# Patient Record
Sex: MALE | Race: WHITE | NOT HISPANIC OR LATINO
[De-identification: names, ages, dates, MRNs, and addresses within clinical notes are randomized per-mention and may not be internally consistent; named-entity substitution may affect disease eponyms.]

---

## 2017-02-02 ENCOUNTER — APPOINTMENT (OUTPATIENT)
Dept: SPINE | Facility: CLINIC | Age: 57
End: 2017-02-02

## 2017-02-02 VITALS
HEIGHT: 72 IN | WEIGHT: 257 LBS | DIASTOLIC BLOOD PRESSURE: 76 MMHG | BODY MASS INDEX: 34.81 KG/M2 | SYSTOLIC BLOOD PRESSURE: 120 MMHG

## 2017-11-30 ENCOUNTER — APPOINTMENT (OUTPATIENT)
Dept: SPINE | Facility: CLINIC | Age: 57
End: 2017-11-30

## 2018-06-18 ENCOUNTER — APPOINTMENT (OUTPATIENT)
Dept: OPHTHALMOLOGY | Facility: CLINIC | Age: 58
End: 2018-06-18

## 2019-11-25 ENCOUNTER — OUTPATIENT (OUTPATIENT)
Dept: OUTPATIENT SERVICES | Facility: HOSPITAL | Age: 59
LOS: 1 days | End: 2019-11-25
Payer: COMMERCIAL

## 2019-11-25 VITALS
RESPIRATION RATE: 16 BRPM | HEART RATE: 82 BPM | DIASTOLIC BLOOD PRESSURE: 80 MMHG | HEIGHT: 72 IN | TEMPERATURE: 98 F | WEIGHT: 274.48 LBS | SYSTOLIC BLOOD PRESSURE: 122 MMHG

## 2019-11-25 DIAGNOSIS — Z98.84 BARIATRIC SURGERY STATUS: Chronic | ICD-10-CM

## 2019-11-25 DIAGNOSIS — Z01.818 ENCOUNTER FOR OTHER PREPROCEDURAL EXAMINATION: ICD-10-CM

## 2019-11-25 DIAGNOSIS — Z90.49 ACQUIRED ABSENCE OF OTHER SPECIFIED PARTS OF DIGESTIVE TRACT: Chronic | ICD-10-CM

## 2019-11-25 DIAGNOSIS — M50.20 OTHER CERVICAL DISC DISPLACEMENT, UNSPECIFIED CERVICAL REGION: ICD-10-CM

## 2019-11-25 DIAGNOSIS — Z98.1 ARTHRODESIS STATUS: Chronic | ICD-10-CM

## 2019-11-25 DIAGNOSIS — Z98.89 OTHER SPECIFIED POSTPROCEDURAL STATES: Chronic | ICD-10-CM

## 2019-11-25 DIAGNOSIS — Z29.9 ENCOUNTER FOR PROPHYLACTIC MEASURES, UNSPECIFIED: ICD-10-CM

## 2019-11-25 LAB
BLD GP AB SCN SERPL QL: SIGNIFICANT CHANGE UP
MRSA PCR RESULT.: SIGNIFICANT CHANGE UP
S AUREUS DNA NOSE QL NAA+PROBE: SIGNIFICANT CHANGE UP

## 2019-11-25 PROCEDURE — 71046 X-RAY EXAM CHEST 2 VIEWS: CPT | Mod: 26

## 2019-11-25 RX ORDER — SODIUM CHLORIDE 9 MG/ML
3 INJECTION INTRAMUSCULAR; INTRAVENOUS; SUBCUTANEOUS EVERY 8 HOURS
Refills: 0 | Status: DISCONTINUED | OUTPATIENT
Start: 2019-12-05 | End: 2019-12-06

## 2019-11-25 NOTE — PATIENT PROFILE ADULT - NSPROEDALEARNPREF_GEN_A_NUR
written material/verbal instruction/individual instruction/surgical wash, MRSA/MSSA & pain management reviewed

## 2019-11-25 NOTE — H&P PST ADULT - NSICDXFAMILYHX_GEN_ALL_CORE_FT
FAMILY HISTORY:  Mother  Still living? No  Family history of hypertension, Age at diagnosis: Age Unknown

## 2019-11-25 NOTE — H&P PST ADULT - ASSESSMENT
medications reviewed, instructions given on what medications to take and what not to take. Asked the patient to take the Blood pressure medication/ heart medication on DOP.   CAPRINI VTE 2.0 SCORE [CLOT updated 2019]    AGE RELATED RISK FACTORS                                                       MOBILITY RELATED FACTORS  [x ] Age 41-60 years                                            (1 Point)                    [ ] Bed rest                                                        (1 Point)  [ ] Age: 61-74 years                                           (2 Points)                  [ ] Plaster cast                                                   (2 Points)  [ ] Age= 75 years                                              (3 Points)                    [ ] Bed bound for more than 72 hours                 (2 Points)    DISEASE RELATED RISK FACTORS                                               GENDER SPECIFIC FACTORS  [ ] Edema in the lower extremities                       (1 Point)              [ ] Pregnancy                                                     (1 Point)  [ ] Varicose veins                                               (1 Point)                     [ ] Post-partum < 6 weeks                                   (1 Point)             [x ] BMI > 25 Kg/m2                                            (1 Point)                     [ ] Hormonal therapy  or oral contraception          (1 Point)                 [ ] Sepsis (in the previous month)                        (1 Point)               [ ] History of pregnancy complications                 (1 point)  [ ] Pneumonia or serious lung disease                                               [ ] Unexplained or recurrent                     (1 Point)           (in the previous month)                               (1 Point)  [ ] Abnormal pulmonary function test                     (1 Point)                 SURGERY RELATED RISK FACTORS  [ ] Acute myocardial infarction                              (1 Point)               [ ]  Section                                             (1 Point)  [ ] Congestive heart failure (in the previous month)  (1 Point)      [ ] Minor surgery                                                  (1 Point)   [ ] Inflammatory bowel disease                             (1 Point)               [ ] Arthroscopic surgery                                        (2 Points)  [ ] Central venous access                                      (2 Points)                [ x] General surgery lasting more than 45 minutes (2 points)  [ ] Malignancy- Present or previous                   (2 Points)                [ ] Elective arthroplasty                                         (5 points)    [ ] Stroke (in the previous month)                          (5 Points)                                                                                                                                                           HEMATOLOGY RELATED FACTORS                                                 TRAUMA RELATED RISK FACTORS  [ ] Prior episodes of VTE                                     (3 Points)                [ ] Fracture of the hip, pelvis, or leg                       (5 Points)  [ ] Positive family history for VTE                         (3 Points)             [ ] Acute spinal cord injury (in the previous month)  (5 Points)  [ ] Prothrombin 46470 A                                     (3 Points)               [ ] Paralysis  (less than 1 month)                             (5 Points)  [ ] Factor V Leiden                                             (3 Points)                  [ ] Multiple Trauma within 1 month                        (5 Points)  [ ] Lupus anticoagulants                                     (3 Points)                                                           [ ] Anticardiolipin antibodies                               (3 Points)                                                       [ ] High homocysteine in the blood                      (3 Points)                                             [ ] Other congenital or acquired thrombophilia      (3 Points)                                                [ ] Heparin induced thrombocytopenia                  (3 Points)                                     Total Score [    4      ]  OPIOID RISK TOOL    KATERINE EACH BOX THAT APPLIES AND ADD TOTALS AT THE END    FAMILY HISTORY OF SUBSTANCE ABUSE                 FEMALE         MALE                                                Alcohol                             [  ]1 pt          [  ]3pts                                               Illegal Drugs                     [  ]2 pts        [  ]3pts                                               Rx Drugs                           [  ]4 pts        [  ]4 pts    PERSONAL HISTORY OF SUBSTANCE ABUSE                                                                                          Alcohol                             [  ]3 pts       [  ]3 pts                                               Illegal Drugs                     [  ]4 pts        [  ]4 pts                                               Rx Drugs                           [  ]5 pts        [  ]5 pts    AGE BETWEEN 16-45 YEARS                                      [  ]1 pt         [  ]1 pt    HISTORY OF PREADOLESCENT   SEXUAL ABUSE                                                             [  ]3 pts        [  ]0pts    PSYCHOLOGICAL DISEASE                     ADD, OCD, Bipolar, Schizophrenia        [  ]2 pts         [  ]2 pts                      Depression                                               [  ]1 pt           [  ]1 pt           SCORING TOTAL   (add numbers and type here)              ( 0)                                     A score of 3 or lower indicated LOW risk for future opioid abuse  A score of 4 to 7 indicated moderate risk for future opioid abuse  A score of 8 or higher indicates a high risk for opioid abuse

## 2019-11-25 NOTE — H&P PST ADULT - NSICDXPASTSURGICALHX_GEN_ALL_CORE_FT
PAST SURGICAL HISTORY:  H/O spinal fusion 2014    History of cholecystectomy     S/P arthroscopy of left knee meniscus repair    S/P gastric bypass 2008

## 2019-11-25 NOTE — H&P PST ADULT - NSICDXPROBLEM_GEN_ALL_CORE_FT
PROBLEM DIAGNOSES  Problem: Need for prophylactic measure  Assessment and Plan: Caprini Score 4 Moderate Risk, surgical team should consider VTE prophylaxis     Problem: Cervical disc herniation  Assessment and Plan: Anterior cervical discectomy and fusion. Medical and cardiac clearance pending

## 2019-11-25 NOTE — H&P PST ADULT - HISTORY OF PRESENT ILLNESS
59 year old male with neck pain radiating to his left side of his shoulder since July 2019, he had surgery done previously in 2014

## 2019-11-25 NOTE — H&P PST ADULT - NSICDXPASTMEDICALHX_GEN_ALL_CORE_FT
PAST MEDICAL HISTORY:  CAD (coronary artery disease) PTCA  with stent  2004    Cervical spinal stenosis     HTN (hypertension)     Morbid obesity gastric  bypass  2008 lost 140 lb    Type 2 diabetes mellitus off Insulin 2008, resolved with 140 lbs weight loss

## 2019-12-04 ENCOUNTER — TRANSCRIPTION ENCOUNTER (OUTPATIENT)
Age: 59
End: 2019-12-04

## 2019-12-05 ENCOUNTER — TRANSCRIPTION ENCOUNTER (OUTPATIENT)
Age: 59
End: 2019-12-05

## 2019-12-05 ENCOUNTER — INPATIENT (INPATIENT)
Facility: HOSPITAL | Age: 59
LOS: 0 days | Discharge: HOME | DRG: 473 | End: 2019-12-06
Attending: SPECIALIST | Admitting: SPECIALIST
Payer: COMMERCIAL

## 2019-12-05 VITALS
RESPIRATION RATE: 16 BRPM | HEART RATE: 56 BPM | DIASTOLIC BLOOD PRESSURE: 60 MMHG | SYSTOLIC BLOOD PRESSURE: 126 MMHG | TEMPERATURE: 98 F | WEIGHT: 270.07 LBS | OXYGEN SATURATION: 99 % | HEIGHT: 72 IN

## 2019-12-05 DIAGNOSIS — Z90.49 ACQUIRED ABSENCE OF OTHER SPECIFIED PARTS OF DIGESTIVE TRACT: Chronic | ICD-10-CM

## 2019-12-05 DIAGNOSIS — Z98.89 OTHER SPECIFIED POSTPROCEDURAL STATES: Chronic | ICD-10-CM

## 2019-12-05 DIAGNOSIS — M50.20 OTHER CERVICAL DISC DISPLACEMENT, UNSPECIFIED CERVICAL REGION: ICD-10-CM

## 2019-12-05 DIAGNOSIS — I25.10 ATHEROSCLEROTIC HEART DISEASE OF NATIVE CORONARY ARTERY WITHOUT ANGINA PECTORIS: ICD-10-CM

## 2019-12-05 DIAGNOSIS — I10 ESSENTIAL (PRIMARY) HYPERTENSION: ICD-10-CM

## 2019-12-05 DIAGNOSIS — Z98.1 ARTHRODESIS STATUS: Chronic | ICD-10-CM

## 2019-12-05 DIAGNOSIS — Z98.84 BARIATRIC SURGERY STATUS: Chronic | ICD-10-CM

## 2019-12-05 LAB — ABO RH CONFIRMATION: SIGNIFICANT CHANGE UP

## 2019-12-05 PROCEDURE — 99222 1ST HOSP IP/OBS MODERATE 55: CPT

## 2019-12-05 RX ORDER — CYCLOBENZAPRINE HYDROCHLORIDE 10 MG/1
10 TABLET, FILM COATED ORAL EVERY 8 HOURS
Refills: 0 | Status: DISCONTINUED | OUTPATIENT
Start: 2019-12-05 | End: 2019-12-06

## 2019-12-05 RX ORDER — INFLUENZA VIRUS VACCINE 15; 15; 15; 15 UG/.5ML; UG/.5ML; UG/.5ML; UG/.5ML
0.5 SUSPENSION INTRAMUSCULAR ONCE
Refills: 0 | Status: COMPLETED | OUTPATIENT
Start: 2019-12-05 | End: 2019-12-06

## 2019-12-05 RX ORDER — SODIUM CHLORIDE 9 MG/ML
1000 INJECTION, SOLUTION INTRAVENOUS
Refills: 0 | Status: DISCONTINUED | OUTPATIENT
Start: 2019-12-05 | End: 2019-12-05

## 2019-12-05 RX ORDER — DEXTROSE MONOHYDRATE, SODIUM CHLORIDE, AND POTASSIUM CHLORIDE 50; .745; 4.5 G/1000ML; G/1000ML; G/1000ML
1000 INJECTION, SOLUTION INTRAVENOUS
Refills: 0 | Status: DISCONTINUED | OUTPATIENT
Start: 2019-12-05 | End: 2019-12-06

## 2019-12-05 RX ORDER — FENTANYL CITRATE 50 UG/ML
25 INJECTION INTRAVENOUS
Refills: 0 | Status: DISCONTINUED | OUTPATIENT
Start: 2019-12-05 | End: 2019-12-05

## 2019-12-05 RX ORDER — SPIRONOLACTONE 25 MG/1
25 TABLET, FILM COATED ORAL DAILY
Refills: 0 | Status: DISCONTINUED | OUTPATIENT
Start: 2019-12-05 | End: 2019-12-06

## 2019-12-05 RX ORDER — CEFAZOLIN SODIUM 1 G
1000 VIAL (EA) INJECTION EVERY 8 HOURS
Refills: 0 | Status: COMPLETED | OUTPATIENT
Start: 2019-12-05 | End: 2019-12-06

## 2019-12-05 RX ORDER — OXYCODONE AND ACETAMINOPHEN 5; 325 MG/1; MG/1
2 TABLET ORAL EVERY 6 HOURS
Refills: 0 | Status: DISCONTINUED | OUTPATIENT
Start: 2019-12-05 | End: 2019-12-05

## 2019-12-05 RX ORDER — ONDANSETRON 8 MG/1
4 TABLET, FILM COATED ORAL ONCE
Refills: 0 | Status: DISCONTINUED | OUTPATIENT
Start: 2019-12-05 | End: 2019-12-05

## 2019-12-05 RX ORDER — ATORVASTATIN CALCIUM 80 MG/1
10 TABLET, FILM COATED ORAL AT BEDTIME
Refills: 0 | Status: DISCONTINUED | OUTPATIENT
Start: 2019-12-05 | End: 2019-12-06

## 2019-12-05 RX ORDER — OXYCODONE AND ACETAMINOPHEN 5; 325 MG/1; MG/1
2 TABLET ORAL EVERY 4 HOURS
Refills: 0 | Status: DISCONTINUED | OUTPATIENT
Start: 2019-12-05 | End: 2019-12-06

## 2019-12-05 RX ORDER — GABAPENTIN 400 MG/1
300 CAPSULE ORAL THREE TIMES A DAY
Refills: 0 | Status: DISCONTINUED | OUTPATIENT
Start: 2019-12-05 | End: 2019-12-06

## 2019-12-05 RX ORDER — ONDANSETRON 8 MG/1
4 TABLET, FILM COATED ORAL EVERY 4 HOURS
Refills: 0 | Status: DISCONTINUED | OUTPATIENT
Start: 2019-12-05 | End: 2019-12-06

## 2019-12-05 RX ORDER — OXYCODONE AND ACETAMINOPHEN 5; 325 MG/1; MG/1
1 TABLET ORAL EVERY 4 HOURS
Refills: 0 | Status: DISCONTINUED | OUTPATIENT
Start: 2019-12-05 | End: 2019-12-06

## 2019-12-05 RX ORDER — FUROSEMIDE 40 MG
1 TABLET ORAL
Qty: 0 | Refills: 0 | DISCHARGE

## 2019-12-05 RX ORDER — FUROSEMIDE 40 MG
40 TABLET ORAL DAILY
Refills: 0 | Status: DISCONTINUED | OUTPATIENT
Start: 2019-12-05 | End: 2019-12-06

## 2019-12-05 RX ORDER — CEFAZOLIN SODIUM 1 G
3000 VIAL (EA) INJECTION ONCE
Refills: 0 | Status: DISCONTINUED | OUTPATIENT
Start: 2019-12-05 | End: 2019-12-05

## 2019-12-05 RX ORDER — SPIRONOLACTONE 25 MG/1
1 TABLET, FILM COATED ORAL
Qty: 0 | Refills: 0 | DISCHARGE

## 2019-12-05 RX ADMIN — ATORVASTATIN CALCIUM 10 MILLIGRAM(S): 80 TABLET, FILM COATED ORAL at 21:31

## 2019-12-05 RX ADMIN — FENTANYL CITRATE 25 MICROGRAM(S): 50 INJECTION INTRAVENOUS at 15:15

## 2019-12-05 RX ADMIN — GABAPENTIN 300 MILLIGRAM(S): 400 CAPSULE ORAL at 21:31

## 2019-12-05 RX ADMIN — OXYCODONE AND ACETAMINOPHEN 2 TABLET(S): 5; 325 TABLET ORAL at 21:31

## 2019-12-05 RX ADMIN — OXYCODONE AND ACETAMINOPHEN 2 TABLET(S): 5; 325 TABLET ORAL at 17:00

## 2019-12-05 RX ADMIN — SODIUM CHLORIDE 3 MILLILITER(S): 9 INJECTION INTRAMUSCULAR; INTRAVENOUS; SUBCUTANEOUS at 21:33

## 2019-12-05 RX ADMIN — FENTANYL CITRATE 25 MICROGRAM(S): 50 INJECTION INTRAVENOUS at 15:17

## 2019-12-05 RX ADMIN — Medication 100 MILLIGRAM(S): at 18:58

## 2019-12-05 RX ADMIN — FENTANYL CITRATE 25 MICROGRAM(S): 50 INJECTION INTRAVENOUS at 14:35

## 2019-12-05 RX ADMIN — FENTANYL CITRATE 25 MICROGRAM(S): 50 INJECTION INTRAVENOUS at 15:16

## 2019-12-05 RX ADMIN — FENTANYL CITRATE 25 MICROGRAM(S): 50 INJECTION INTRAVENOUS at 14:54

## 2019-12-05 RX ADMIN — FENTANYL CITRATE 25 MICROGRAM(S): 50 INJECTION INTRAVENOUS at 15:10

## 2019-12-05 RX ADMIN — FENTANYL CITRATE 25 MICROGRAM(S): 50 INJECTION INTRAVENOUS at 14:30

## 2019-12-05 RX ADMIN — OXYCODONE AND ACETAMINOPHEN 2 TABLET(S): 5; 325 TABLET ORAL at 15:55

## 2019-12-05 RX ADMIN — FENTANYL CITRATE 25 MICROGRAM(S): 50 INJECTION INTRAVENOUS at 14:53

## 2019-12-05 RX ADMIN — OXYCODONE AND ACETAMINOPHEN 2 TABLET(S): 5; 325 TABLET ORAL at 22:30

## 2019-12-05 RX ADMIN — DEXTROSE MONOHYDRATE, SODIUM CHLORIDE, AND POTASSIUM CHLORIDE 75 MILLILITER(S): 50; .745; 4.5 INJECTION, SOLUTION INTRAVENOUS at 20:42

## 2019-12-05 NOTE — DISCHARGE NOTE PROVIDER - CARE PROVIDER_API CALL
Stanley Estrada)  Orthopaedic Surgery  [facility] [id number]  Phone: [phone]  Fax: [phone]  Follow Up Time:

## 2019-12-05 NOTE — CONSULT NOTE ADULT - SUBJECTIVE AND OBJECTIVE BOX
Patient is a 59y old  Male who presents with a chief complaint of "Cervical fusion" (25 Nov 2019 11:17)      HPI:  59 year old male with neck pain radiating to his left side of his shoulder, worsening  since July 2019, he had surgery done previously in 2014,   Now he is s/p ACDF C6-C7 , POD # 0 .      PAST MEDICAL & SURGICAL HISTORY:  CAD (coronary artery disease): PTCA  with stent  2004  Cervical spinal stenosis  Type 2 diabetes mellitus: off Insulin 2008, resolved with 140 lbs weight loss  Morbid obesity: gastric  bypass  2008 lost 140 lb  HTN (hypertension)  H/O spinal fusion: 2014  History of cholecystectomy  S/P arthroscopy of left knee: meniscus repair  S/P gastric bypass: 2008      Social History:  Tobacco - denies   ETOH - denies  Illicit drug abuse - denies    FAMILY HISTORY:  Family history of hypertension (Mother)      Allergies    No Known Allergies    Intolerances      HOME MEDICATIONS :   .           ASA 81 mg daily   · 	gabapentin 300 mg oral capsule: Last Dose Taken:  , 1 cap(s) orally 3 times a day  · 	Diovan 320 mg oral tablet: Last Dose Taken:  , 1 tab(s) orally once a day  · 	atorvastatin 10 mg oral tablet: Last Dose Taken:  , 1 tab(s) orally once a day  · 	spironolactone 25 mg oral tablet: 1 tab(s) orally once a day  · 	furosemide 40 mg oral tablet: Last Dose Taken:  , 1 tab(s) orally once a day  REVIEW OF SYSTEMS:    Chronic neck pain with radiation to LUE   MEDICATIONS  (STANDING):  atorvastatin 10 milliGRAM(s) Oral at bedtime  ceFAZolin   IVPB 1000 milliGRAM(s) IV Intermittent every 8 hours  dextrose 5% + sodium chloride 0.45% with potassium chloride 20 mEq/L 1000 milliLiter(s) (75 mL/Hr) IV Continuous <Continuous>  furosemide    Tablet 40 milliGRAM(s) Oral daily  gabapentin 300 milliGRAM(s) Oral three times a day  lactated ringers. 1000 milliLiter(s) (125 mL/Hr) IV Continuous <Continuous>  sodium chloride 0.9% lock flush 3 milliLiter(s) IV Push every 8 hours  spironolactone 25 milliGRAM(s) Oral daily    MEDICATIONS  (PRN):  aluminum hydroxide/magnesium hydroxide/simethicone Suspension 30 milliLiter(s) Oral every 12 hours PRN Indigestion  cyclobenzaprine 10 milliGRAM(s) Oral every 8 hours PRN Muscle Spasm  ondansetron   Disintegrating Tablet 4 milliGRAM(s) Oral every 4 hours PRN Nausea  ondansetron Injectable 4 milliGRAM(s) IV Push once PRN Nausea and/or Vomiting  oxycodone    5 mG/acetaminophen 325 mG 1 Tablet(s) Oral every 4 hours PRN Moderate Pain (4 - 6)  oxycodone    5 mG/acetaminophen 325 mG 2 Tablet(s) Oral every 6 hours PRN Severe Pain (7 - 10)      Vital Signs Last 24 Hrs  T(C): 36.4 (05 Dec 2019 15:15), Max: 37 (05 Dec 2019 13:55)  T(F): 97.6 (05 Dec 2019 15:15), Max: 98.6 (05 Dec 2019 13:55)  HR: 63 (05 Dec 2019 15:55) (55 - 555)  BP: 116/69 (05 Dec 2019 15:55) (104/72 - 130/70)  BP(mean): --  RR: 24 (05 Dec 2019 15:55) (11 - 24)  SpO2: 97% (05 Dec 2019 15:55) (97% - 100%)    PHYSICAL EXAM:    GENERAL: NAD, well-groomed, well-developed  HEAD:  Atraumatic, Normocephalic  EYES: EOMI, PERRLA, conjunctiva and sclera clear  NECK: Supple, No JVD, Normal thyroid  NERVOUS SYSTEM:  Alert & Oriented X3,  CHEST/LUNG: CTA  b/l,  no rales, rhonchi, wheezing, or rubs  HEART: Regular rate and rhythm; No murmurs, rubs, or gallops  ABDOMEN: Soft, Nontender, Nondistended; Bowel sounds present  EXTREMITIES:  2+ Peripheral Pulses, No clubbing, cyanosis, or edema ,   LYMPH: No lymphadenopathy noted  SKIN: No rashes or lesions    LABS: Pending     RADIOLOGY & ADDITIONAL STUDIES:\  < from: CT Cervical Spine w/Cont (11.05.14 @ 11:33) >  EXAM:  CERVICAL SPINE CT W CONT                            PROCEDURE DATE:  Nov 5 2014       INTERPRETATION:  Fluoroscopically guided cervical myelogram.    CLINICAL INDICATION: Neck pain radiating to the left arm with numbness   and tingling.    COMPARISON STUDIES:  Cervical spine MRI 10/9/2014 from [facility].    FLUOROSCOPY TIME:  0.9 minutes.    TECHNIQUE: The patient was informed of the risks, benefits, and   alternatives of the procedure and gave willing consent. The patient was   placed prone on the fluoroscopy table.  AP and lateral views of the   cervical spine were obtained. The lower back was prepped and draped in   usual sterile fashion. Under fluoroscopic guidance the L2-L3 interspace   was localized. 1% Lidocaine anesthetic was administered subcutaneously in   this region. Under intermittent fluoroscopic guidance a 6 inch 22-gauge   spinal needle was advanced until its tip was within the thecal sac at the   L2-L3 level. 10 cc of Omnipaque 300 was injected intrathecally. The   spinal needle was withdrawn. The patient tolerated the procedure well and   without complication.    AP, lateral, and shallow and steep oblique views of the cervical spine   were obtained.  Then, the patient was transported to the CT suite where   direct axial CT scanning of the cervical spine was obtained.  Sagittal   and coronal reformats were provided.    Findings: Conventional cervical myelogram: There is truncation of the   nerve roots at C5-C6 on the left.    CT cervical myelogram:    The craniocervical junction is unremarkable. Vertebral body height and   alignment are maintained without an acute fracture or dislocation.    C2-C3: There is a central disc protrusion which is eccentric towards the   right which nearly reaches the cervical cord and results in mild spinal   stenosis. There is no neural foraminal narrowing.    C3-C4: There is a right eccentric disc protrusion which demonstrates   focal mass effect on the right lateral ventral cervical cord with   moderate spinal stenosis. There is no neural foraminal narrowing.    C4-C5: There is uncinate hypertrophy and a central disc protrusion which   results in moderate spinal stenosis with mass effect and compression of   the cervical cord. There is mild left neural foraminal narrowing.    C5-C6: There is a disc bulge and uncinate hypertrophy which results in   mass effect and compression of the left paracentral cervical cord. There   is disc material within the left lateral recess which narrows the neural   foramen.    C6-C7: There is a disc bulge and uncinate hypertrophy which results in   moderate spinal stenosis and compression of the cervical cord. There is   no neural foraminal narrowing.    C7-T1: There is no spinal canal or neural foraminal narrowing.    There is thickening of the posterior longitudinal ligament which extends   from C2 through C6-C7.  Areas of ossification are noted along the   posterior aspect of the C4 and C5 vertebral bodies. This is most severe   at C4 and is better evaluated on the prior MRI of the cervical spine.    IMPRESSION: Multilevel degenerative changes of the cervical spine with   mass effect and compression of the cervical cord.  Disc herniations at C2-3, C3-4 and C4-5 with mass effect upon the spinal   cord atC3-4 and C4 .  Diffuse thickening of the posterior longitudinal ligament from C2 through   C6-7.This finding is better seen on the prior MRI of the cervical spine.         ALEXANDER REYES M.D., RADIOLOGY FELLOW  DEONDRE LYLE M.D., ATTENDING RADIOLOGIST    This examination was interpreted on: Nov 5 2014 11:47A.  This document   has been electronically signed. Nov 5 2014  2:18P.          < end of copied text >  < from: 12 Lead ECG (11.19.14 @ 15:43) >    Ventricular Rate 62 BPM    Atrial Rate 62 BPM    P-R Interval 146 ms    QRS Duration 124 ms     ms    QTc 420 ms    P Axis 54 degrees    R Axis 50 degrees    T Axis 26 degrees    Diagnosis Line   NORMAL SINUS RHYTHM  RIGHT BUNDLE BRANCH BLOCK  Confirmed by MD FABIOLA, XI ([id number]),  YOLIS HERNANDEZ (1) on 21-Nov-2014 08:54:17    < end of copied text > Patient is a 59y old  Male who is s/p ACDF C6- C7 , POD # 0 . Seen on PACU .    CC: Neck pain with radiation to L ARM       HPI:  59 year old male with neck pain radiating to his left side of his shoulder, worsening  since July 2019, he had surgery done previously in 2014,   Now he is s/p ACDF C6-C7 , POD # 0 .      PAST MEDICAL & SURGICAL HISTORY:  CAD (coronary artery disease): PTCA  with stent  2004  Cervical spinal stenosis  Type 2 diabetes mellitus: off Insulin 2008, resolved with 140 lbs weight loss  Morbid obesity: gastric  bypass  2008 lost 140 lb  HTN (hypertension)  H/O spinal fusion: 2014  History of cholecystectomy  S/P arthroscopy of left knee: meniscus repair  S/P gastric bypass: 2008      Social History:  Tobacco - denies   ETOH - denies  Illicit drug abuse - denies    FAMILY HISTORY:  Family history of hypertension (Mother)      Allergies    No Known Allergies    Intolerances      HOME MEDICATIONS :   .           ASA 81 mg daily   · 	gabapentin 300 mg oral capsule: Last Dose Taken:  , 1 cap(s) orally 3 times a day  · 	Diovan 320 mg oral tablet: Last Dose Taken:  , 1 tab(s) orally once a day  · 	atorvastatin 10 mg oral tablet: Last Dose Taken:  , 1 tab(s) orally once a day  · 	spironolactone 25 mg oral tablet: 1 tab(s) orally once a day  · 	furosemide 40 mg oral tablet: Last Dose Taken:  , 1 tab(s) orally once a day  REVIEW OF SYSTEMS:    Chronic neck pain with radiation to LUE   MEDICATIONS  (STANDING):  atorvastatin 10 milliGRAM(s) Oral at bedtime  ceFAZolin   IVPB 1000 milliGRAM(s) IV Intermittent every 8 hours  dextrose 5% + sodium chloride 0.45% with potassium chloride 20 mEq/L 1000 milliLiter(s) (75 mL/Hr) IV Continuous <Continuous>  furosemide    Tablet 40 milliGRAM(s) Oral daily  gabapentin 300 milliGRAM(s) Oral three times a day  lactated ringers. 1000 milliLiter(s) (125 mL/Hr) IV Continuous <Continuous>  sodium chloride 0.9% lock flush 3 milliLiter(s) IV Push every 8 hours  spironolactone 25 milliGRAM(s) Oral daily    MEDICATIONS  (PRN):  aluminum hydroxide/magnesium hydroxide/simethicone Suspension 30 milliLiter(s) Oral every 12 hours PRN Indigestion  cyclobenzaprine 10 milliGRAM(s) Oral every 8 hours PRN Muscle Spasm  ondansetron   Disintegrating Tablet 4 milliGRAM(s) Oral every 4 hours PRN Nausea  ondansetron Injectable 4 milliGRAM(s) IV Push once PRN Nausea and/or Vomiting  oxycodone    5 mG/acetaminophen 325 mG 1 Tablet(s) Oral every 4 hours PRN Moderate Pain (4 - 6)  oxycodone    5 mG/acetaminophen 325 mG 2 Tablet(s) Oral every 6 hours PRN Severe Pain (7 - 10)      Vital Signs Last 24 Hrs  T(C): 36.4 (05 Dec 2019 15:15), Max: 37 (05 Dec 2019 13:55)  T(F): 97.6 (05 Dec 2019 15:15), Max: 98.6 (05 Dec 2019 13:55)  HR: 63 (05 Dec 2019 15:55) (55 - 555)  BP: 116/69 (05 Dec 2019 15:55) (104/72 - 130/70)  BP(mean): --  RR: 24 (05 Dec 2019 15:55) (11 - 24)  SpO2: 97% (05 Dec 2019 15:55) (97% - 100%)    PHYSICAL EXAM:    GENERAL: NAD, well-groomed, well-developed  HEAD:  Atraumatic, Normocephalic  EYES: EOMI, PERRLA, conjunctiva and sclera clear  NECK: dry dressing  + , HV +   NERVOUS SYSTEM:  Alert & Oriented X3,  CHEST/LUNG: CTA  b/l,  no rales, rhonchi, wheezing, or rubs  HEART: Regular rate and rhythm; No murmurs, rubs, or gallops  ABDOMEN: Soft, Nontender, Nondistended; Bowel sounds present  EXTREMITIES:  2+ Peripheral Pulses, No clubbing, cyanosis, or edema ,   LYMPH: No lymphadenopathy noted  SKIN: No rashes or lesions    LABS: Pending     RADIOLOGY & ADDITIONAL STUDIES:\  < from: CT Cervical Spine w/Cont (11.05.14 @ 11:33) >  EXAM:  CERVICAL SPINE CT W CONT                            PROCEDURE DATE:  Nov 5 2014       INTERPRETATION:  Fluoroscopically guided cervical myelogram.    CLINICAL INDICATION: Neck pain radiating to the left arm with numbness   and tingling.    COMPARISON STUDIES:  Cervical spine MRI 10/9/2014 from [facility].    FLUOROSCOPY TIME:  0.9 minutes.    TECHNIQUE: The patient was informed of the risks, benefits, and   alternatives of the procedure and gave willing consent. The patient was   placed prone on the fluoroscopy table.  AP and lateral views of the   cervical spine were obtained. The lower back was prepped and draped in   usual sterile fashion. Under fluoroscopic guidance the L2-L3 interspace   was localized. 1% Lidocaine anesthetic was administered subcutaneously in   this region. Under intermittent fluoroscopic guidance a 6 inch 22-gauge   spinal needle was advanced until its tip was within the thecal sac at the   L2-L3 level. 10 cc of Omnipaque 300 was injected intrathecally. The   spinal needle was withdrawn. The patient tolerated the procedure well and   without complication.    AP, lateral, and shallow and steep oblique views of the cervical spine   were obtained.  Then, the patient was transported to the CT suite where   direct axial CT scanning of the cervical spine was obtained.  Sagittal   and coronal reformats were provided.    Findings: Conventional cervical myelogram: There is truncation of the   nerve roots at C5-C6 on the left.    CT cervical myelogram:    The craniocervical junction is unremarkable. Vertebral body height and   alignment are maintained without an acute fracture or dislocation.    C2-C3: There is a central disc protrusion which is eccentric towards the   right which nearly reaches the cervical cord and results in mild spinal   stenosis. There is no neural foraminal narrowing.    C3-C4: There is a right eccentric disc protrusion which demonstrates   focal mass effect on the right lateral ventral cervical cord with   moderate spinal stenosis. There is no neural foraminal narrowing.    C4-C5: There is uncinate hypertrophy and a central disc protrusion which   results in moderate spinal stenosis with mass effect and compression of   the cervical cord. There is mild left neural foraminal narrowing.    C5-C6: There is a disc bulge and uncinate hypertrophy which results in   mass effect and compression of the left paracentral cervical cord. There   is disc material within the left lateral recess which narrows the neural   foramen.    C6-C7: There is a disc bulge and uncinate hypertrophy which results in   moderate spinal stenosis and compression of the cervical cord. There is   no neural foraminal narrowing.    C7-T1: There is no spinal canal or neural foraminal narrowing.    There is thickening of the posterior longitudinal ligament which extends   from C2 through C6-C7.  Areas of ossification are noted along the   posterior aspect of the C4 and C5 vertebral bodies. This is most severe   at C4 and is better evaluated on the prior MRI of the cervical spine.    IMPRESSION: Multilevel degenerative changes of the cervical spine with   mass effect and compression of the cervical cord.  Disc herniations at C2-3, C3-4 and C4-5 with mass effect upon the spinal   cord atC3-4 and C4 .  Diffuse thickening of the posterior longitudinal ligament from C2 through   C6-7.This finding is better seen on the prior MRI of the cervical spine.         ALEXANDER REYES M.D., RADIOLOGY FELLOW  DEONDRE LYLE M.D., ATTENDING RADIOLOGIST    This examination was interpreted on: Nov 5 2014 11:47A.  This document   has been electronically signed. Nov 5 2014  2:18P.          < end of copied text >  < from: 12 Lead ECG (11.19.14 @ 15:43) >    Ventricular Rate 62 BPM    Atrial Rate 62 BPM    P-R Interval 146 ms    QRS Duration 124 ms     ms    QTc 420 ms    P Axis 54 degrees    R Axis 50 degrees    T Axis 26 degrees    Diagnosis Line   NORMAL SINUS RHYTHM  RIGHT BUNDLE BRANCH BLOCK  Confirmed by MD FABIOLA, XI ([id number]),  YOLIS HERNANDEZ (1) on 21-Nov-2014 08:54:17    < end of copied text >

## 2019-12-05 NOTE — DISCHARGE NOTE PROVIDER - NSDCCPCAREPLAN_GEN_ALL_CORE_FT
PRINCIPAL DISCHARGE DIAGNOSIS  Diagnosis: Cervical disc herniation  Assessment and Plan of Treatment:

## 2019-12-05 NOTE — DISCHARGE NOTE PROVIDER - NSDCACTIVITY_GEN_ALL_CORE
Do not make important decisions/Walking - Indoors allowed/Walking - Outdoors allowed/Do not drive or operate machinery

## 2019-12-05 NOTE — DISCHARGE NOTE PROVIDER - HOSPITAL COURSE
The patient was admitted for elective anterior cervical discectomy and fusion on 12/5/19. The post operative course was uneventful.  PT/OT Worked with patient for acute rehabilitation process. The pain was adequately controlled and patient is able to go home as she can accomplish ADL with help from family member at this time.  The cervical collar was worn for comfort when out of bed. The patient was admitted for elective anterior cervical discectomy and fusion on 12/5/19. The post operative course was uneventful.  PT/OT Worked with patient for acute rehabilitation process. The pain was adequately controlled and patient is able to go home as she can accomplish ADL with help from family member at this time.

## 2019-12-05 NOTE — ASU PREOP CHECKLIST - SITE MARKED BY ANESTHESIOLOGIST
EMERGENCY DEPARTMENT HISTORY AND PHYSICAL EXAM    Date: 2/6/2018  Patient Name: Waqas Bobby    History of Presenting Illness     Chief Complaint   Patient presents with    Facial Pain         History Provided By: Patient    Chief Complaint: face pain  Duration: 1 Days  Timing:  Acute  Location: left side of face, under eye  Quality: Throbbing  Severity: 10 out of 10  Modifying Factors: none  Associated Symptoms:  rhinorrhea, sinus pressure onset 12 hours ago, and HA    Additional History (Context):   8:49 PM  Waqas Bobby is a 50 y.o. female with PMHX of HTN, DM, HLD, and CAD who presents to the emergency department C/O left-sided 10/10 facial swelling and throbbing pain under the eye onset 1 day. Associated sxs include rhinorrhea, sinus pressure onset 12 hours ago, and HA. Pt states that she has no idea why her sxs are occurring. NKDA. Pt denies fever, chills, ear pain, congestion, SOB, blurry vision, and any other sxs or complaints. PCP: None    Current Outpatient Prescriptions   Medication Sig Dispense Refill    amoxicillin-clavulanate (AUGMENTIN) 875-125 mg per tablet Take 1 Tab by mouth two (2) times a day. 20 Tab 0    valsartan-hydroCHLOROthiazide (DIOVAN-HCT) 160-12.5 mg per tablet Take 1 Tab by mouth daily.  atorvastatin (LIPITOR) 40 mg tablet Take 40 mg by mouth nightly.  clopidogrel (PLAVIX) 75 mg tablet Take 75 mg by mouth daily.  metoprolol tartrate (LOPRESSOR) 50 mg tablet Take  by mouth two (2) times a day.  metFORMIN (GLUCOPHAGE) 500 mg tablet Take  by mouth two (2) times daily (with meals).  omeprazole (PRILOSEC) 20 mg capsule Take 20 mg by mouth daily (after dinner).  aspirin 81 mg chewable tablet Take 81 mg by mouth daily.          Past History     Past Medical History:  Past Medical History:   Diagnosis Date    Abnormal heart rhythm     Arthritis     bilateral knees    CAD (coronary artery disease)     \"Mild heart attack in 01/2015\"    Diabetes ([facility] [address]  High cholesterol     Hypertension     Ill-defined condition     heart burn    Ill-defined condition     headaches    Morbid obesity (HCC)        Past Surgical History:  Past Surgical History:   Procedure Laterality Date    HX HYSTERECTOMY      KP       Family History:  History reviewed. No pertinent family history. Social History:  Social History   Substance Use Topics    Smoking status: Current Every Day Smoker     Packs/day: 1.00     Years: 23.00    Smokeless tobacco: Never Used      Comment: patient instructed to stop smoking x 24 hours prior to surgery    Alcohol use No       Allergies:  No Known Allergies      Review of Systems   Review of Systems   Constitutional: Negative for chills and fever. HENT: Positive for facial swelling (under left eye), rhinorrhea and sinus pressure. Negative for congestion and ear pain. (+) face pain under left eye   Eyes: Negative for visual disturbance (blurred vision). Respiratory: Negative for shortness of breath. Neurological: Positive for headaches. All other systems reviewed and are negative. Physical Exam     Vitals:    02/06/18 1810 02/06/18 2139   BP: (!) 214/98 (!) 190/109   Pulse: 95    Resp: 18    Temp: 99.3 °F (37.4 °C)    SpO2: 100%    Weight: 113.9 kg (251 lb)    Height: 5' 5\" (1.651 m)      Physical Exam   Constitutional: She is oriented to person, place, and time. Vital signs are normal. She appears well-developed and well-nourished. No distress. HENT:   Head: Normocephalic and atraumatic. Right Ear: Hearing, tympanic membrane, external ear and ear canal normal.   Left Ear: Hearing, tympanic membrane, external ear and ear canal normal.   Nose: Mucosal edema present. Right sinus exhibits maxillary sinus tenderness and frontal sinus tenderness. Left sinus exhibits maxillary sinus tenderness and frontal sinus tenderness.    Mouth/Throat: Uvula is midline, oropharynx is clear and moist and mucous membranes are normal. No oropharyngeal exudate. Eyes: Conjunctivae and EOM are normal. Pupils are equal, round, and reactive to light. Right eye exhibits no discharge. Left eye exhibits no discharge. Neck: Normal range of motion. Neck supple. Cardiovascular: Normal rate, regular rhythm and normal heart sounds. Pulmonary/Chest: Effort normal and breath sounds normal. No respiratory distress. She has no wheezes. She has no rales. She exhibits no tenderness. Abdominal: Soft. Bowel sounds are normal. She exhibits no distension and no mass. There is no tenderness. There is no rebound and no guarding. Musculoskeletal: Normal range of motion. Neurological: She is alert and oriented to person, place, and time. Skin: Skin is warm and dry. She is not diaphoretic. Psychiatric: She has a normal mood and affect. Her behavior is normal.   Nursing note and vitals reviewed. Diagnostic Study Results     Labs -   No results found for this or any previous visit (from the past 12 hour(s)). Radiologic Studies -   No orders to display     CT Results  (Last 48 hours)    None        CXR Results  (Last 48 hours)    None          Medications given in the ED-  Medications - No data to display      Medical Decision Making   I am the first provider for this patient. I reviewed the vital signs, available nursing notes, past medical history, past surgical history, family history and social history. Vital Signs-Reviewed the patient's vital signs. Pulse Oximetry Analysis - 100% on RA     Records Reviewed: Nursing Notes    Provider Notes (Medical Decision Making):     Procedures:  Procedures    ED Course:   8:49 PM Initial assessment performed. The patients presenting problems have been discussed, and they are in agreement with the care plan formulated and outlined with them. I have encouraged them to ask questions as they arise throughout their visit. 9:17 PM RN made aware of elevated BP.  Pt states increased pain and having not taken her BP meds has elevated her BP. Pt states BP is not abnormal for her. She denies CP/SOB/headache/dizziness. Given she is asymptomatic and reports this is normal for her, requesting discharge and states she will take her BP meds as soon as she gets home-- advised RN OK to discharge to home. Diagnosis and Disposition       DISCHARGE NOTE:  9:17 PM  \[facility] [place] [facility]\""  results have been reviewed with her. She has been counseled regarding her diagnosis, treatment, and plan. She verbally conveys understanding and agreement of the signs, symptoms, diagnosis, treatment and prognosis and additionally agrees to follow up as discussed. She also agrees with the care-plan and conveys that all of her questions have been answered. I have also provided discharge instructions for her that include: educational information regarding their diagnosis and treatment, and list of reasons why they would want to return to the ED prior to their follow-up appointment, should her condition change. She has been provided with education for proper emergency department utilization. CLINICAL IMPRESSION:    1. Acute non-recurrent pansinusitis        PLAN:  1. D/C Home  2. Discharge Medication List as of 2/6/2018  9:17 PM      START taking these medications    Details   amoxicillin-clavulanate (AUGMENTIN) 875-125 mg per tablet Take 1 Tab by mouth two (2) times a day., Normal, Disp-20 Tab, R-0         CONTINUE these medications which have NOT CHANGED    Details   valsartan-hydroCHLOROthiazide (DIOVAN-HCT) 160-12.5 mg per tablet Take 1 Tab by mouth daily. , Historical Med      atorvastatin (LIPITOR) 40 mg tablet Take 40 mg by mouth nightly., Historical Med      clopidogrel (PLAVIX) 75 mg tablet Take 75 mg by mouth daily. , Historical Med      metoprolol tartrate (LOPRESSOR) 50 mg tablet Take  by mouth two (2) times a day., Historical Med      metFORMIN (GLUCOPHAGE) 500 mg tablet Take  by mouth two (2) times daily (with meals). , Historical Med      omeprazole (PRILOSEC) 20 mg capsule Take 20 mg by mouth daily (after dinner). , Historical Med      aspirin 81 mg chewable tablet Take 81 mg by mouth daily. , Historical Med           3. Follow-up Information     Follow up With Details Comments Contact Info    [facility] Schedule an appointment as soon as possible for a visit in 2 days For primary care [place] up [id number] [facility], [address] [facility] [address]    [facility] EMERGENCY DEPT Go to As needed, If symptoms worsen [address] [facility] [id number]  [phone]        _______________________________    Attestations: This note is prepared by Chasity Waterman, acting as Scribe for [employer]. [employer]:  The scribe's documentation has been prepared under my direction and personally reviewed by me in its entirety.   I confirm that the note above accurately reflects all work, treatment, procedures, and medical decision making performed by me.  _______________________________ n/a

## 2019-12-05 NOTE — PROGRESS NOTE ADULT - SUBJECTIVE AND OBJECTIVE BOX
ORTHO-POST-OP PROGRESS NOTE:      [id number]    PHILIP MAIER      PROCEDURE: Anterior cervical discectomy and fusion C6-C7  Surgeon: Dr. Estrada  DOS: 12/5/19     Patient seen and examined. Patient reports of moderate anterior cervicalgia that is controlled by pain medications. Patient denies of acute sensory or motor changes. Patient tolerating PO fluids and soft diet well. No dysphonia, no dysphagia, no dyspnea. Patient states left upper extremity radiculopathy symptoms have resolved.                   I&O's Detail    05 Dec 2019 07:01  -  05 Dec 2019 19:32  --------------------------------------------------------  IN:    lactated ringers.: 500 mL    Oral Fluid: 200 mL  Total IN: 700 mL    OUT:    Voided: 300 mL  Total OUT: 300 mL    Total NET: 400 mL            aluminum hydroxide/magnesium hydroxide/simethicone Suspension 30 milliLiter(s) Oral every 12 hours PRN  atorvastatin 10 milliGRAM(s) Oral at bedtime  ceFAZolin   IVPB 1000 milliGRAM(s) IV Intermittent every 8 hours  cyclobenzaprine 10 milliGRAM(s) Oral every 8 hours PRN  dextrose 5% + sodium chloride 0.45% with potassium chloride 20 mEq/L 1000 milliLiter(s) IV Continuous <Continuous>  furosemide    Tablet 40 milliGRAM(s) Oral daily  gabapentin 300 milliGRAM(s) Oral three times a day  influenza   Vaccine 0.5 milliLiter(s) IntraMuscular once  ondansetron   Disintegrating Tablet 4 milliGRAM(s) Oral every 4 hours PRN  oxycodone    5 mG/acetaminophen 325 mG 1 Tablet(s) Oral every 4 hours PRN  oxycodone    5 mG/acetaminophen 325 mG 2 Tablet(s) Oral every 6 hours PRN  sodium chloride 0.9% lock flush 3 milliLiter(s) IV Push every 8 hours  spironolactone 25 milliGRAM(s) Oral daily        T(C): 36.6 (12-05-19 @ 16:57), Max: 37 (12-05-19 @ 13:55)  HR: 57 (12-05-19 @ 16:57) (55 - 555)  BP: 117/72 (12-05-19 @ 16:57) (104/72 - 130/70)  RR: 18 (12-05-19 @ 16:57) (11 - 24)  SpO2: 93% (12-05-19 @ 16:57) (93% - 100%)  Wt(kg): --      PHYSICAL EXAM:     Constitutional: Alert, responsive, in no acute distress.     Injured Extremity:          Anterior neck Dressing: Clean/dry/intact. No active bleeding or discharge noted. HV noted. under suction, functioning appropriately           Skin: Wound is clean and intact. No active discharge. No wound dehiscence.            Sensation: normal to light touch. No focal deficits noted of the lower extremities. No new paresthesia of upper extremity.                Motor exam: 5/5 shoulder abduction, elbow flexion, wrist extension, elbow extension, finger flexion bilaterally. No focal weaknesses noted.                                                               Vascular:  + warm well perfused; capillary refill <3 seconds                                                           No pathologic reflexes   A/P :  60 y/o Male S/P Anterior cervical discectomy and fusion C6-C7   POD# 0    -  Pain control  -  DVT ppx: [x]SCDs  only  -  PT and out of bed today  -  f/u HV output  -  postop abx: Ancef  -  Weight bearing status: WBAT with assistance of a rolling walker  -  Dispo: most likely ORTHO-POST-OP PROGRESS NOTE:      [id number]    PHILIP MAIER      PROCEDURE: Anterior cervical discectomy and fusion C6-C7  Surgeon: Dr. Estrada  DOS: 12/5/19     Patient seen and examined. Patient reports of moderate anterior cervicalgia- as expected, that is controlled by pain medications. Patient denies of acute sensory or motor changes. Patient tolerating PO fluids and soft diet well. No dysphonia, no dysphagia, no dyspnea. Patient states left upper extremity radiculopathy symptoms have resolved.                   I&O's Detail    05 Dec 2019 07:01  -  05 Dec 2019 19:32  --------------------------------------------------------  IN:    lactated ringers.: 500 mL    Oral Fluid: 200 mL  Total IN: 700 mL    OUT:    Voided: 300 mL  Total OUT: 300 mL    Total NET: 400 mL            aluminum hydroxide/magnesium hydroxide/simethicone Suspension 30 milliLiter(s) Oral every 12 hours PRN  atorvastatin 10 milliGRAM(s) Oral at bedtime  ceFAZolin   IVPB 1000 milliGRAM(s) IV Intermittent every 8 hours  cyclobenzaprine 10 milliGRAM(s) Oral every 8 hours PRN  dextrose 5% + sodium chloride 0.45% with potassium chloride 20 mEq/L 1000 milliLiter(s) IV Continuous <Continuous>  furosemide    Tablet 40 milliGRAM(s) Oral daily  gabapentin 300 milliGRAM(s) Oral three times a day  influenza   Vaccine 0.5 milliLiter(s) IntraMuscular once  ondansetron   Disintegrating Tablet 4 milliGRAM(s) Oral every 4 hours PRN  oxycodone    5 mG/acetaminophen 325 mG 1 Tablet(s) Oral every 4 hours PRN  oxycodone    5 mG/acetaminophen 325 mG 2 Tablet(s) Oral every 6 hours PRN  sodium chloride 0.9% lock flush 3 milliLiter(s) IV Push every 8 hours  spironolactone 25 milliGRAM(s) Oral daily        T(C): 36.6 (12-05-19 @ 16:57), Max: 37 (12-05-19 @ 13:55)  HR: 57 (12-05-19 @ 16:57) (35 - 555)  BP: 117/72 (12-05-19 @ 16:57) (104/72 - 130/70)  RR: 18 (12-05-19 @ 16:57) (11 - 24)  SpO2: 93% (12-05-19 @ 16:57) (93% - 100%)  Wt(kg): --      PHYSICAL EXAM:     Constitutional: Alert, responsive, in no acute distress.     Injured Extremity:          Anterior neck Dressing: Clean/dry/intact. No active bleeding or discharge noted. HV noted. under suction, functioning appropriately           Skin: Wound is clean and intact. No active discharge. No wound dehiscence.            Sensation: normal to light touch. No focal deficits noted of the lower extremities. No new paresthesia of upper extremity.                Motor exam: 5/5 shoulder abduction, elbow flexion, wrist extension, elbow extension, finger flexion bilaterally. No focal weaknesses noted.                                                               Vascular:  + warm well perfused; capillary refill <3 seconds                                                           No pathologic reflexes   A/P :  60 y/o Male S/P Anterior cervical discectomy and fusion C6-C7   POD# 0    -  Pain control  -  DVT ppx: [x]SCDs  only  -  PT and out of bed today  -  f/u HV output  -  postop abx: Ancef  -  Weight bearing status: WBAT with assistance of a rolling walker  -  Dispo: most likely home tomorrow vs Saturday

## 2019-12-05 NOTE — DISCHARGE NOTE PROVIDER - NSDCFUADDINST_GEN_ALL_CORE_FT
Leave occulsive dressing on wound for one week. You may then remove it and leave open to air. Protect while showering.  Leave steri strips intact, they will fall off on their own or be removed on first post op visit. Wear cervical collar when out of bed for comfort, especially when you are passenger in a car , may take off for meals & showering.  Physical therapy will be prescribed on second office visit.  For now, engage in light activity as tolerated, no lifting greater than 5 lb.  No driving while on pain meds and must wear cervical collar when in a vehicle for 28 days. Leave occlusive dressing on wound for one week. You may then remove it and leave open to air. Protect while showering.  Leave steri strips intact, they will fall off on their own or be removed on first post op visit. Wear cervical collar when out of bed for comfort, especially when you are passenger in a car , may take off for meals & showering.  Physical therapy will be prescribed on second office visit.  For now, engage in light activity as tolerated, no lifting greater than 5 lb.  No driving while on pain meds and must wear cervical collar when in a vehicle for 28 days. Leave occlusive dressing on wound for one week. You may then remove it and leave open to air. Protect while showering. Patient will continue Aspirin home dose.  Leave steri strips intact, they will fall off on their own or be removed on first post op visit. Wear cervical collar when out of bed for comfort, especially when you are passenger in a car , may take off for meals & showering.  Physical therapy will be prescribed on second office visit.  For now, engage in light activity as tolerated, no lifting greater than 5 lb.  No driving while on pain meds and must wear cervical collar when in a vehicle for 28 days. Leave occlusive dressing on wound for one week. You may then remove it and leave open to air. Protect while showering. Patient will continue Aspirin home dose.  Leave steri strips intact, they will fall off on their own or be removed on first post op visit. Physical therapy will be prescribed on second office visit.  For now, engage in light activity as tolerated, no lifting greater than 5 lb.  No driving while on pain meds and must wear cervical collar when in a vehicle for 28 days. Leave occlusive dressing on wound for one week. You may then remove it and leave open to air. Protect while showering. Patient will continue Aspirin home dose.  Leave steri strips intact, they will fall off on their own or be removed on first post op visit. Physical therapy will be prescribed on second office visit.  For now, engage in light activity as tolerated, no lifting greater than 5 lb.  No driving while on pain meds.

## 2019-12-05 NOTE — BRIEF OPERATIVE NOTE - NSICDXBRIEFPROCEDURE_GEN_ALL_CORE_FT
PROCEDURES:  Anterior cervical discectomy with fusion, 1 level 05-Dec-2019 13:39:28  Nikko Zimmerman N

## 2019-12-05 NOTE — CONSULT NOTE ADULT - PROBLEM SELECTOR RECOMMENDATION 9
s/p ACDF C6-C 7 -  PT/OT/pain mgmt  DVT prophylaxis- as per ortho  Abx as per SCIP  Incentive spirometry  Prophylaxis of opioid  induced constipation.

## 2019-12-05 NOTE — CONSULT NOTE ADULT - ASSESSMENT
59 year old male with neck pain radiating to his left side of his shoulder, worsening  since July 2019, he had surgery done previously in 2014,   Now he is s/p ACDF C6-C7 , POD # 0 .

## 2019-12-05 NOTE — BRIEF OPERATIVE NOTE - NSICDXBRIEFPOSTOP_GEN_ALL_CORE_FT
POST-OP DIAGNOSIS:  S/P cervical spinal fusion 05-Dec-2019 13:40:28  Nikko Zimmerman  Cervical disc herniation 05-Dec-2019 13:40:15  Nikko Zimmerman

## 2019-12-05 NOTE — DISCHARGE NOTE PROVIDER - NSDCMRMEDTOKEN_GEN_ALL_CORE_FT
atorvastatin 10 mg oral tablet: 1 tab(s) orally once a day  Diovan 320 mg oral tablet: 1 tab(s) orally once a day  furosemide 40 mg oral tablet: 1 tab(s) orally once a day  gabapentin 300 mg oral capsule: 1 cap(s) orally 3 times a day  spironolactone 25 mg oral tablet: 1 tab(s) orally once a day atorvastatin 10 mg oral tablet: 1 tab(s) orally once a day  Diovan 320 mg oral tablet: 1 tab(s) orally once a day  furosemide 40 mg oral tablet: 1 tab(s) orally once a day  gabapentin 300 mg oral capsule: 1 cap(s) orally 3 times a day  Percocet 5/325 oral tablet: 1 tab(s) orally every 4 to 6 hours, As Needed for moderate pain. MDD:6   Senna S 50 mg-8.6 mg oral tablet: 2 tab(s) orally once a day (at bedtime) MDD:2  spironolactone 25 mg oral tablet: 1 tab(s) orally once a day

## 2019-12-05 NOTE — BRIEF OPERATIVE NOTE - NSICDXBRIEFPREOP_GEN_ALL_CORE_FT
PRE-OP DIAGNOSIS:  S/P cervical spinal fusion 05-Dec-2019 13:39:59  Nikko Zimmerman  Cervical disc herniation 05-Dec-2019 13:39:46  Nikko Zimmerman

## 2019-12-06 ENCOUNTER — TRANSCRIPTION ENCOUNTER (OUTPATIENT)
Age: 59
End: 2019-12-06

## 2019-12-06 VITALS
SYSTOLIC BLOOD PRESSURE: 121 MMHG | HEART RATE: 73 BPM | TEMPERATURE: 98 F | DIASTOLIC BLOOD PRESSURE: 76 MMHG | RESPIRATION RATE: 18 BRPM | OXYGEN SATURATION: 95 %

## 2019-12-06 LAB
ANION GAP SERPL CALC-SCNC: 11 MMOL/L — SIGNIFICANT CHANGE UP (ref 5–17)
BLD GP AB SCN SERPL QL: SIGNIFICANT CHANGE UP
BUN SERPL-MCNC: 17 MG/DL — SIGNIFICANT CHANGE UP (ref 8–20)
CALCIUM SERPL-MCNC: 8.8 MG/DL — SIGNIFICANT CHANGE UP (ref 8.6–10.2)
CHLORIDE SERPL-SCNC: 99 MMOL/L — SIGNIFICANT CHANGE UP (ref 98–107)
CO2 SERPL-SCNC: 28 MMOL/L — SIGNIFICANT CHANGE UP (ref 22–29)
CREAT SERPL-MCNC: 1.24 MG/DL — SIGNIFICANT CHANGE UP (ref 0.5–1.3)
GLUCOSE SERPL-MCNC: 152 MG/DL — HIGH (ref 70–115)
HCT VFR BLD CALC: 39.5 % — SIGNIFICANT CHANGE UP (ref 39–50)
HGB BLD-MCNC: 12.6 G/DL — LOW (ref 13–17)
MCHC RBC-ENTMCNC: 30.9 PG — SIGNIFICANT CHANGE UP (ref 27–34)
MCHC RBC-ENTMCNC: 31.9 GM/DL — LOW (ref 32–36)
MCV RBC AUTO: 96.8 FL — SIGNIFICANT CHANGE UP (ref 80–100)
PLATELET # BLD AUTO: 131 K/UL — LOW (ref 150–400)
POTASSIUM SERPL-MCNC: 5 MMOL/L — SIGNIFICANT CHANGE UP (ref 3.5–5.3)
POTASSIUM SERPL-SCNC: 5 MMOL/L — SIGNIFICANT CHANGE UP (ref 3.5–5.3)
RBC # BLD: 4.08 M/UL — LOW (ref 4.2–5.8)
RBC # FLD: 12.4 % — SIGNIFICANT CHANGE UP (ref 10.3–14.5)
SODIUM SERPL-SCNC: 138 MMOL/L — SIGNIFICANT CHANGE UP (ref 135–145)
WBC # BLD: 6.86 K/UL — SIGNIFICANT CHANGE UP (ref 3.8–10.5)
WBC # FLD AUTO: 6.86 K/UL — SIGNIFICANT CHANGE UP (ref 3.8–10.5)

## 2019-12-06 PROCEDURE — 97163 PT EVAL HIGH COMPLEX 45 MIN: CPT

## 2019-12-06 PROCEDURE — 76000 FLUOROSCOPY <1 HR PHYS/QHP: CPT

## 2019-12-06 PROCEDURE — 80048 BASIC METABOLIC PNL TOTAL CA: CPT

## 2019-12-06 PROCEDURE — C1889: CPT

## 2019-12-06 PROCEDURE — 71046 X-RAY EXAM CHEST 2 VIEWS: CPT

## 2019-12-06 PROCEDURE — 86850 RBC ANTIBODY SCREEN: CPT

## 2019-12-06 PROCEDURE — 86901 BLOOD TYPING SEROLOGIC RH(D): CPT

## 2019-12-06 PROCEDURE — 99232 SBSQ HOSP IP/OBS MODERATE 35: CPT

## 2019-12-06 PROCEDURE — 36415 COLL VENOUS BLD VENIPUNCTURE: CPT

## 2019-12-06 PROCEDURE — C1713: CPT

## 2019-12-06 PROCEDURE — 86900 BLOOD TYPING SEROLOGIC ABO: CPT

## 2019-12-06 PROCEDURE — 90686 IIV4 VACC NO PRSV 0.5 ML IM: CPT

## 2019-12-06 PROCEDURE — G0463: CPT

## 2019-12-06 PROCEDURE — 85027 COMPLETE CBC AUTOMATED: CPT

## 2019-12-06 RX ORDER — SENNOSIDES/DOCUSATE SODIUM 8.6MG-50MG
2 TABLET ORAL
Qty: 10 | Refills: 0
Start: 2019-12-06 | End: 2019-12-10

## 2019-12-06 RX ADMIN — OXYCODONE AND ACETAMINOPHEN 2 TABLET(S): 5; 325 TABLET ORAL at 11:10

## 2019-12-06 RX ADMIN — GABAPENTIN 300 MILLIGRAM(S): 400 CAPSULE ORAL at 06:22

## 2019-12-06 RX ADMIN — Medication 40 MILLIGRAM(S): at 06:22

## 2019-12-06 RX ADMIN — OXYCODONE AND ACETAMINOPHEN 2 TABLET(S): 5; 325 TABLET ORAL at 06:25

## 2019-12-06 RX ADMIN — Medication 100 MILLIGRAM(S): at 02:27

## 2019-12-06 RX ADMIN — SODIUM CHLORIDE 3 MILLILITER(S): 9 INJECTION INTRAMUSCULAR; INTRAVENOUS; SUBCUTANEOUS at 13:11

## 2019-12-06 RX ADMIN — INFLUENZA VIRUS VACCINE 0.5 MILLILITER(S): 15; 15; 15; 15 SUSPENSION INTRAMUSCULAR at 12:34

## 2019-12-06 RX ADMIN — SPIRONOLACTONE 25 MILLIGRAM(S): 25 TABLET, FILM COATED ORAL at 06:22

## 2019-12-06 RX ADMIN — OXYCODONE AND ACETAMINOPHEN 2 TABLET(S): 5; 325 TABLET ORAL at 03:00

## 2019-12-06 RX ADMIN — GABAPENTIN 300 MILLIGRAM(S): 400 CAPSULE ORAL at 13:10

## 2019-12-06 RX ADMIN — SODIUM CHLORIDE 3 MILLILITER(S): 9 INJECTION INTRAMUSCULAR; INTRAVENOUS; SUBCUTANEOUS at 06:24

## 2019-12-06 RX ADMIN — OXYCODONE AND ACETAMINOPHEN 2 TABLET(S): 5; 325 TABLET ORAL at 12:00

## 2019-12-06 RX ADMIN — OXYCODONE AND ACETAMINOPHEN 2 TABLET(S): 5; 325 TABLET ORAL at 02:25

## 2019-12-06 RX ADMIN — OXYCODONE AND ACETAMINOPHEN 2 TABLET(S): 5; 325 TABLET ORAL at 07:00

## 2019-12-06 NOTE — PROGRESS NOTE ADULT - SUBJECTIVE AND OBJECTIVE BOX
PHILIP MAIER    [id number]    POST OPERATIVE DAY #1  STATUS POST: Anterior cervical discectomy and fusion C6-C7. Patient is doing well, pain controlled. Patient is working with physical therapy. Denies acute motor or sensory changes. No other complaints.     OBJECTIVE:     Vital Signs Last 24 Hrs  T(C): 36.6 (06 Dec 2019 04:43), Max: 37 (05 Dec 2019 13:55)  T(F): 97.8 (06 Dec 2019 04:43), Max: 98.6 (05 Dec 2019 13:55)  HR: 54 (06 Dec 2019 04:43) (54 - 555)  BP: 105/70 (06 Dec 2019 04:43) (102/53 - 130/70)  BP(mean): --  RR: 18 (06 Dec 2019 04:43) (11 - 24)  SpO2: 95% (06 Dec 2019 04:43) (93% - 100%)  I&O's Summary    05 Dec 2019 07:01  -  06 Dec 2019 07:00  --------------------------------------------------------  IN: 700 mL / OUT: 1005 mL / NET: -305 mL        Constitutional: Alert, awake, NAD    Spine:          Dressing: clean/dry/intact- dressing taken down, steri-strips intact. serosang spotting noted on steri strips. Drain removed. New dressing placed with gauze and tegaderm.          Drains:  present with OUTPUT of 5cc. drain removed.          Sensation:         [ ] Upper extremity                  ax      mc           m          u          r                                                         R          +           +             +           +          +                                               L           +           +             +           +          +         [ ] Lower extremity             sp         dp         saph       dodd         tibial                                                R          +           +             +           +          +                                               L           +           +             +           +          +            Motor exam:         [ ] Upper extremity              Bi(c5)  WE(c6)  EE(c7)   FF(c8)                                                R         5/5        5/5        5/5       5/5                                               L          5/5        5/5        5/5       5/5         [ ] Lower extremity          HF(l2)   KE(l3)    TA(l4)   EHL(l5)  GS(s1)                                                 R        5/5        5/5        5/5       5/5         5/5                                               L         5/5        5/5       5/5       5/5          5/5                                                         Vascular:  warm well perfused; capillary refill <3 seconds b/l. Calves soft NT b/l       A/P :  59y Male S/P  Anterior cervical discectomy and fusion C6-C7 POD#1  -    Pain control  -    DVT ppx: SCDs  -    Periop abx:  Ancef    -    Check AM labs     -    Resume home meds  -    Physical Therapy  -    Weight bearing status: WBAT  -    Dispo: Home today PHILIP MAIER    [id number]    POST OPERATIVE DAY #1  STATUS POST: Anterior cervical discectomy and fusion C6-C7. Patient is doing well, pain controlled. Patient is working with physical therapy. Denies acute motor or sensory changes. No other complaints.     OBJECTIVE:     Vital Signs Last 24 Hrs  T(C): 36.6 (06 Dec 2019 04:43), Max: 37 (05 Dec 2019 13:55)  T(F): 97.8 (06 Dec 2019 04:43), Max: 98.6 (05 Dec 2019 13:55)  HR: 54 (06 Dec 2019 04:43) (54 - 555)  BP: 105/70 (06 Dec 2019 04:43) (102/53 - 130/70)  BP(mean): --  RR: 18 (06 Dec 2019 04:43) (11 - 24)  SpO2: 95% (06 Dec 2019 04:43) (93% - 100%)  I&O's Summary    05 Dec 2019 07:01  -  06 Dec 2019 07:00  --------------------------------------------------------  IN: 700 mL / OUT: 1005 mL / NET: -305 mL        Constitutional: Alert, awake, NAD    Spine:          Dressing: clean/dry/intact- dressing taken down, steri-strips intact. serosang spotting noted on steri strips. Drain removed. New dressing placed with gauze and tegaderm.          Drains:  present with OUTPUT of 5cc. drain removed.          Sensation:         [ ] Upper extremity                  ax      mc           m          u          r                                                         R          +           +             +           +          +                                               L           +           +             +           +          +         [ ] Lower extremity             sp         dp         saph       dodd         tibial                                                R          +           +             +           +          +                                               L           +           +             +           +          +            Motor exam:         [ ] Upper extremity              Bi(c5)  WE(c6)  EE(c7)   FF(c8)                                                R         5/5        5/5        5/5       5/5                                               L          5/5        5/5        5/5       5/5         [ ] Lower extremity          HF(l2)   KE(l3)    TA(l4)   EHL(l5)  GS(s1)                                                 R        5/5        5/5        5/5       5/5         5/5                                               L         5/5        5/5       5/5       5/5          5/5                                                         Vascular:  warm well perfused; capillary refill <3 seconds b/l. Calves soft NT b/l       A/P :  59y Male S/P  Anterior cervical discectomy and fusion C6-C7 POD#1  -    Pain control  -    DVT ppx: SCDs  -    Periop abx:  Ancef    -    Check AM labs     -    Resume home meds  -    Physical Therapy  -    Weight bearing status: WBAT  -    Case d/w Dr Sean BAUGH collar not mandatory for discharge  -    Dispo: Home today

## 2019-12-06 NOTE — PHYSICAL THERAPY INITIAL EVALUATION ADULT - GENERAL OBSERVATIONS, REHAB EVAL
Pt received sitting in bed supine, +telemetry, +, +IV, +Venous compression boots, pt c/o 7/10 neck pain pre and post PT, received pain meds prior to PT, and agreeable to PT.

## 2019-12-06 NOTE — PROGRESS NOTE ADULT - ASSESSMENT
59 year old male with neck pain radiating to his left side of his shoulder, worsening  since July 2019, he had surgery done previously in 2014,   Now he is s/p ACDF C6-C7 , POD # 1 . Doing well , pain well controlled , no n/v , tolerating diet .      Problem/Recommendation - 1:  Problem: Cervical disc herniation. Recommendation: s/p ACDF C6-C 7 -  PT/OT/pain mgmt  DVT prophylaxis- as per ortho  Abx as per SCIP  Incentive spirometry  Prophylaxis of opioid  induced constipation.  Wound care as per ortho      Problem/Recommendation - 2:  ·  Problem: Essential hypertension.  Recommendation: continue Home meds with parameters.      Problem/Recommendation - 3:  ·  Problem: Coronary artery disease involving native coronary artery of native heart without angina pectoris.  Recommendation: - restart ASA 81 mg once OK with spinal , continue statins , not on any BB.     Problem/Recommendation - 4:  ·  Problem: Need for prophylactic measure.  Recommendation: - as per primary team.      Medically stable to d/c once cleared by physical therapy / ortho .

## 2019-12-06 NOTE — PROGRESS NOTE ADULT - SUBJECTIVE AND OBJECTIVE BOX
Patient seen this am 12/06/2019 at bedside POD#1 s/p acdf c6-7. Reports good pain control, improved left upper extremity radicular pain. AVSS. Dressing intact, drain functional. Moves bilateral upper and lower extremities with good strength.  Plan: D/c hemovac, d/c home today. Will send e script for percocet.

## 2019-12-06 NOTE — DISCHARGE NOTE NURSING/CASE MANAGEMENT/SOCIAL WORK - PATIENT PORTAL LINK FT
You can access the FollowMyHealth Patient Portal offered by [facility] by registering at the following website: [email or web address]. By joining [email or web address]’s FollowMyHealth portal, you will also be able to view your health information using other applications (apps) compatible with our system.

## 2019-12-06 NOTE — PHYSICAL THERAPY INITIAL EVALUATION ADULT - PERTINENT HX OF CURRENT PROBLEM, REHAB EVAL
59 year old male with neck pain radiating to his left side of his shoulder, worsening since July 2019, he had surgery for C2-C3 done previously in 2014. Pt is now s/p elective anterior cervical discectomy w/ fusion of C6-C7. Post-op x-rays unremarkable for abnormal findings.

## 2019-12-06 NOTE — PROGRESS NOTE ADULT - SUBJECTIVE AND OBJECTIVE BOX
Patient seen and examined . S/p ACDF   , POD # 1. Doing well , pain well controlled , drain removed , no n/v , tolerating PO .     CC : Neck pain radiating to L arm , this am with mild neck pain , no L arm pain       MEDICATIONS  (STANDING):  atorvastatin 10 milliGRAM(s) Oral at bedtime  dextrose 5% + sodium chloride 0.45% with potassium chloride 20 mEq/L 1000 milliLiter(s) (75 mL/Hr) IV Continuous <Continuous>  furosemide    Tablet 40 milliGRAM(s) Oral daily  gabapentin 300 milliGRAM(s) Oral three times a day  influenza   Vaccine 0.5 milliLiter(s) IntraMuscular once  sodium chloride 0.9% lock flush 3 milliLiter(s) IV Push every 8 hours  spironolactone 25 milliGRAM(s) Oral daily    MEDICATIONS  (PRN):  aluminum hydroxide/magnesium hydroxide/simethicone Suspension 30 milliLiter(s) Oral every 12 hours PRN Indigestion  cyclobenzaprine 10 milliGRAM(s) Oral every 8 hours PRN Muscle Spasm  ondansetron   Disintegrating Tablet 4 milliGRAM(s) Oral every 4 hours PRN Nausea  oxycodone    5 mG/acetaminophen 325 mG 2 Tablet(s) Oral every 4 hours PRN Severe Pain (7 - 10)  oxycodone    5 mG/acetaminophen 325 mG 1 Tablet(s) Oral every 4 hours PRN Moderate Pain (4 - 6)      LABS:                          12.6   6.86  )-----------( 131      ( 06 Dec 2019 09:09 )             39.5     12-06    138  |  99  |  17.0  ----------------------------<  152<H>  5.0   |  28.0  |  1.24    Ca    8.8      06 Dec 2019 09:09          REVIEW OF SYSTEMS:    As above , all other systems are reviewed and are negative   Vital Signs Last 24 Hrs  T(C): 36.8 (06 Dec 2019 09:22), Max: 37 (05 Dec 2019 13:55)  T(F): 98.3 (06 Dec 2019 09:22), Max: 98.6 (05 Dec 2019 13:55)  HR: 59 (06 Dec 2019 09:22) (54 - 555)  BP: 107/69 (06 Dec 2019 09:22) (102/53 - 130/70)  BP(mean): --  RR: 189 (06 Dec 2019 09:22) (11 - 189)  SpO2: 92% (06 Dec 2019 09:22) (92% - 100%)  PHYSICAL EXAM:    GENERAL: NAD, well-groomed, well-developed  HEAD:  Atraumatic, Normocephalic  EYES: EOMI, PERRLA, conjunctiva and sclera clear  NECK: Supple, No JVD, Normal thyroid , dry dressing anterior neck +   NERVOUS SYSTEM:  Alert & Oriented X3, no focal deficit  CHEST/LUNG: CTA b/l ,  no  rales, rhonchi, wheezing, or rubs  HEART: Regular rate and rhythm; No murmurs, rubs, or gallops  ABDOMEN: Soft, Nontender, Nondistended; Bowel sounds present  EXTREMITIES:  2+ Peripheral Pulses, No clubbing, cyanosis, or edema   LYMPH: No lymphadenopathy noted  SKIN: No rashes or lesions

## 2019-12-06 NOTE — PHYSICAL THERAPY INITIAL EVALUATION ADULT - ADDITIONAL COMMENTS
[place] with his wife and step-daughter, with 15 steps to enter with platform breaks after 6 and 3 steps, following a final 6 steps to main level of home, all with bilateral hand rails. Prior to admission pt was independent with all functional activity and has never used or owned any DME. Pt works as a  prior, climbing up and down manholes most days, pt reports he does not plan on going back to work. Pt states his wife and step-daughter are available to physically assist as needed at home.

## 2020-01-22 NOTE — PATIENT PROFILE ADULT - VISION (WITH CORRECTIVE LENSES IF THE PATIENT USUALLY WEARS THEM):
Normal vision: sees adequately in most situations; can see medication labels, newsprint
Janie Jimenez

## 2022-03-07 ENCOUNTER — APPOINTMENT (OUTPATIENT)
Dept: UROLOGY | Facility: CLINIC | Age: 62
End: 2022-03-07
Payer: COMMERCIAL

## 2022-03-07 VITALS
SYSTOLIC BLOOD PRESSURE: 128 MMHG | BODY MASS INDEX: 36.57 KG/M2 | HEART RATE: 62 BPM | HEIGHT: 72 IN | DIASTOLIC BLOOD PRESSURE: 87 MMHG | TEMPERATURE: 98 F | WEIGHT: 270 LBS

## 2022-03-07 PROCEDURE — 99204 OFFICE O/P NEW MOD 45 MIN: CPT

## 2022-03-07 NOTE — LETTER BODY
[Dear  ___] : Dear  [unfilled], [Consult Letter:] : I had the pleasure of evaluating your patient, [unfilled]. [Please see my note below.] : Please see my note below. [Consult Closing:] : Thank you very much for allowing me to participate in the care of this patient.  If you have any questions, please do not hesitate to contact me. [Sincerely,] : Sincerely, [FreeTextEntry3] : Tyree Maurer, DO\par Genitourinary Medicine\par

## 2022-03-07 NOTE — PHYSICAL EXAM
[General Appearance - Well Developed] : well developed [General Appearance - Well Nourished] : well nourished [Normal Appearance] : normal appearance [Well Groomed] : well groomed [General Appearance - In No Acute Distress] : no acute distress [Edema] : no peripheral edema [Respiration, Rhythm And Depth] : normal respiratory rhythm and effort [Exaggerated Use Of Accessory Muscles For Inspiration] : no accessory muscle use [Abdomen Soft] : soft [Abdomen Tenderness] : non-tender [Costovertebral Angle Tenderness] : no ~M costovertebral angle tenderness [Urethral Meatus] : meatus normal [Penis Abnormality] : normal circumcised penis [Urinary Bladder Findings] : the bladder was normal on palpation [Scrotum] : the scrotum was normal [Epididymis] : the epididymides were normal [Testes Tenderness] : no tenderness of the testes [Testes Mass (___cm)] : there were no testicular masses [Prostate Enlargement] : the prostate was not enlarged [Prostate Tenderness] : the prostate was not tender [No Prostate Nodules] : no prostate nodules [FreeTextEntry1] : no penile calcifications appreciated [Normal Station and Gait] : the gait and station were normal for the patient's age [] : no rash [No Focal Deficits] : no focal deficits [Oriented To Time, Place, And Person] : oriented to person, place, and time [Affect] : the affect was normal [Mood] : the mood was normal [Not Anxious] : not anxious

## 2022-03-07 NOTE — HISTORY OF PRESENT ILLNESS
[FreeTextEntry1] : Mr. PHILIP MAIER 61 year old  M  PMH HTN, HLD, afib on Xarelto, DM, GERD and PSH aortic valve, cervical fusion, cholecystectomy, gastric bypass. Pt comes in bc of peVestec. Pt noticed this 4 years ago. Not painful Pt states it is painful for his partner when the have intercourse. Pt rates his erections 5/10. Pt feels he urinates well. no FMH of cancer. Nonsmoker. \par

## 2022-03-16 LAB
APPEARANCE: CLEAR
BACTERIA: NEGATIVE
BILIRUBIN URINE: NEGATIVE
BLOOD URINE: NEGATIVE
COLOR: COLORLESS
GLUCOSE QUALITATIVE U: NEGATIVE
HYALINE CASTS: 0 /LPF
KETONES URINE: NEGATIVE
LEUKOCYTE ESTERASE URINE: NEGATIVE
MICROSCOPIC-UA: NORMAL
NITRITE URINE: NEGATIVE
PH URINE: 6
PROTEIN URINE: NEGATIVE
RED BLOOD CELLS URINE: 0 /HPF
SPECIFIC GRAVITY URINE: 1.01
SQUAMOUS EPITHELIAL CELLS: 1 /HPF
UROBILINOGEN URINE: NORMAL
WHITE BLOOD CELLS URINE: 1 /HPF

## 2022-05-04 DIAGNOSIS — N48.6 INDURATION PENIS PLASTICA: ICD-10-CM

## 2022-05-04 DIAGNOSIS — N52.9 MALE ERECTILE DYSFUNCTION, UNSPECIFIED: ICD-10-CM

## 2022-05-04 NOTE — HISTORY OF PRESENT ILLNESS
[FreeTextEntry1] : Patient is a 61-year-old gentleman who presents with a chief complaint of penile curvature and erectile dysfunction. He states that this has been present for the past 2 years. It causes both he and his partner great stress.  I reviewed the questionnaire he completed in detail. His erections are not modified with the degree of sexual stimulation.   He states that his erections presently are often less than 5 out of 10 in both tumescence and rigidity, insufficient for penetration.   He often ejaculates through a flaccid phallus.  He has difficulty maintaining an erection. He describes a normal libido.  His sexual dysfunction occurs with both sexual relations and masturbation.  His erections are not improved with PDE5 inhibitors.    His partner is understanding and was unable to be with him at the visit today. He is not  and has adult children.  \par \par The patient denies fevers, chills, nausea and/or vomiting and no unexplained weight loss.  His past medical history is non-contributory.  In his present occupation he has no known toxin exposure. He does not smoke and drinks socially.  He has no known drug allergies. His review of systems and past medical history demonstrates no significant urologic issues (see patient completed questionnaire). His family history demonstrates no significant urologic issues.

## 2022-05-05 ENCOUNTER — APPOINTMENT (OUTPATIENT)
Dept: UROLOGY | Facility: CLINIC | Age: 62
End: 2022-05-05

## 2022-05-20 ENCOUNTER — APPOINTMENT (OUTPATIENT)
Dept: ORTHOPEDIC SURGERY | Facility: CLINIC | Age: 62
End: 2022-05-20

## 2022-09-05 ENCOUNTER — NON-APPOINTMENT (OUTPATIENT)
Age: 62
End: 2022-09-05

## 2023-04-25 ENCOUNTER — APPOINTMENT (OUTPATIENT)
Dept: ORTHOPEDIC SURGERY | Facility: CLINIC | Age: 63
End: 2023-04-25
Payer: MEDICARE

## 2023-04-25 VITALS — BODY MASS INDEX: 36.57 KG/M2 | HEIGHT: 72 IN | WEIGHT: 270 LBS

## 2023-04-25 PROCEDURE — 99214 OFFICE O/P EST MOD 30 MIN: CPT | Mod: 25

## 2023-04-25 PROCEDURE — 20550 NJX 1 TENDON SHEATH/LIGAMENT: CPT | Mod: RT

## 2023-04-25 NOTE — IMAGING
[de-identified] : Right hand with no swelling or erythema. Able to make fist, oppose thumb to small finger with good thenar bulk. No intrinsic atrophy. Tender along distal palmar crease, most notable at index finger. No overt locking with catching palpable. Sensation intact throughout with <2sec cap refill.

## 2023-04-25 NOTE — HISTORY OF PRESENT ILLNESS
[de-identified] : 62M, RHD, PMHX of Heart Disease, DM II presents with right hand index finger clicking, locking and sticking for approx 1 month. Reports was an annoyance at first, now starting to affect ADLs. Denies numbness/tingling, denies trauma/injury. Will have to manually extend finger once in a while.

## 2023-04-25 NOTE — ASSESSMENT
[FreeTextEntry1] : Right index finger trigger - Discussed with patient the pathoanatomy of trigger and options going forward. Risks/benefits discussed as well as natural progression that injection will provide some relief but symptoms may recur. Discussed that pain may worsen in coming days but will subside. Discussed that we can repeat an injection or that operative intervention may be indicated down the line.\par \par Procedure 1 - 0.5cc 1%lidocaine + 0.5cc 40mg/cc Kenalog administered to right index finger A1 pulley following sterilization with Betadine. Patient tolerated this well.\par \par F/u 3 months

## 2023-04-26 ENCOUNTER — APPOINTMENT (OUTPATIENT)
Dept: ORTHOPEDIC SURGERY | Facility: CLINIC | Age: 63
End: 2023-04-26
Payer: MEDICARE

## 2023-04-26 VITALS — HEIGHT: 72 IN | WEIGHT: 270 LBS | BODY MASS INDEX: 36.57 KG/M2

## 2023-04-26 DIAGNOSIS — M48.02 SPINAL STENOSIS, CERVICAL REGION: ICD-10-CM

## 2023-04-26 PROCEDURE — 99213 OFFICE O/P EST LOW 20 MIN: CPT

## 2023-04-26 PROCEDURE — 72040 X-RAY EXAM NECK SPINE 2-3 VW: CPT

## 2023-04-27 NOTE — DISCUSSION/SUMMARY
[de-identified] : At this time, the patient is not symptomatic. Recommend one of your follow up. He will follow up with cardiology and follow up for indicated hand surgery. \par \par Prior to appointment and during encounter with patient extensive medical records were reviewed including but not limited to, hospital records, outpatient records, imaging results, and lab data.During this appointment the patient was examined, diagnoses were discussed and explained in a face to face manner. In addition extensive time was spent reviewing aforementioned diagnostic studies. Counseling including abnormal image results, differential diagnoses, treatment options, risk and benefits, lifestyle changes, current condition, and current medications was performed. Patient's comments, questions, and concerns were addressed and patient verbalized understanding. Based on this patient's presentation at our office, which is an orthopedic spine surgeon's office, this patient inherently / intrinsically has a risk, however minute, of developing issues such as Cauda equina syndrome, bowel and bladder changes, or progression of motor or neurological deficits such as paralysis which may be permanent.

## 2023-04-27 NOTE — PHYSICAL EXAM
[Biceps 2+] : biceps 2+ [Triceps 2+] : triceps 2+ [Brachioradialis 2+] : brachioradialis 2+ [Normal Coordination] : normal coordination [Normal DTR UE/LE] : normal DTR UE/LE  [Normal Sensation] : normal sensation [Normal Mood and Affect] : normal mood and affect [Orientated] : orientated [Able to Communicate] : able to communicate [Normal Skin] : normal skin [No Rash] : no rash [No Ulcers] : no ulcers [No Lesions] : no lesions [No obvious lymphadenopathy in areas examined] : no obvious lymphadenopathy in areas examined [Well Developed] : well developed [Peripheral vascular exam is grossly normal] : peripheral vascular exam is grossly normal [No Respiratory Distress] : no respiratory distress [de-identified] : Constitutional:\par - General Appearance:\par Unremarkable\par Body Habitus\par Well Developed\par Well Nourished\par Body Habitus\par No Deformities\par Well Groomed\par Ability To communicate:\par Normal\par Neurologic:\par Global sensation is intact to upper and lower extremities. See examination of Neck and/or Spine\par for exceptions.\par Orientation to Time, Place and Person is: Normal\par Mood And Affect is Normal\par Skin:\par - Head/Face, Right Upper/Lower Extremity, Left Upper/Lower Extremity: Normal\par See Examination of Neck and/or Spine for exceptions\par Cardiovascular:\par Peripheral Cardiovascular System is Normal\par Palpation of Lymph Nodes:\par Normal Palpation of lymph nodes in: Axilla, Cervical, Inguinal\par Abnormal Palpation of lymph nodes in: None  [] : negative Lsater reflex [FreeTextEntry9] : Cervical range of motion is restricted but he does have 30° bilateral cervical rotation and approximately 30° of flexion and extension.

## 2023-04-27 NOTE — REVIEW OF SYSTEMS
[Arthralgia] : arthralgia [Joint Pain] : joint pain [Negative] : Heme/Lymph [FreeTextEntry9] : cervical spine

## 2023-04-27 NOTE — DISCUSSION/SUMMARY
[de-identified] : At this time, the patient is not symptomatic. Recommend one of your follow up. He will follow up with cardiology and follow up for indicated hand surgery. \par \par Prior to appointment and during encounter with patient extensive medical records were reviewed including but not limited to, hospital records, outpatient records, imaging results, and lab data.During this appointment the patient was examined, diagnoses were discussed and explained in a face to face manner. In addition extensive time was spent reviewing aforementioned diagnostic studies. Counseling including abnormal image results, differential diagnoses, treatment options, risk and benefits, lifestyle changes, current condition, and current medications was performed. Patient's comments, questions, and concerns were addressed and patient verbalized understanding. Based on this patient's presentation at our office, which is an orthopedic spine surgeon's office, this patient inherently / intrinsically has a risk, however minute, of developing issues such as Cauda equina syndrome, bowel and bladder changes, or progression of motor or neurological deficits such as paralysis which may be permanent.

## 2023-04-27 NOTE — PHYSICAL EXAM
[Biceps 2+] : biceps 2+ [Triceps 2+] : triceps 2+ [Brachioradialis 2+] : brachioradialis 2+ [Normal Coordination] : normal coordination [Normal DTR UE/LE] : normal DTR UE/LE  [Normal Sensation] : normal sensation [Normal Mood and Affect] : normal mood and affect [Orientated] : orientated [Able to Communicate] : able to communicate [Normal Skin] : normal skin [No Rash] : no rash [No Ulcers] : no ulcers [No Lesions] : no lesions [No obvious lymphadenopathy in areas examined] : no obvious lymphadenopathy in areas examined [Well Developed] : well developed [Peripheral vascular exam is grossly normal] : peripheral vascular exam is grossly normal [No Respiratory Distress] : no respiratory distress [de-identified] : Constitutional:\par - General Appearance:\par Unremarkable\par Body Habitus\par Well Developed\par Well Nourished\par Body Habitus\par No Deformities\par Well Groomed\par Ability To communicate:\par Normal\par Neurologic:\par Global sensation is intact to upper and lower extremities. See examination of Neck and/or Spine\par for exceptions.\par Orientation to Time, Place and Person is: Normal\par Mood And Affect is Normal\par Skin:\par - Head/Face, Right Upper/Lower Extremity, Left Upper/Lower Extremity: Normal\par See Examination of Neck and/or Spine for exceptions\par Cardiovascular:\par Peripheral Cardiovascular System is Normal\par Palpation of Lymph Nodes:\par Normal Palpation of lymph nodes in: Axilla, Cervical, Inguinal\par Abnormal Palpation of lymph nodes in: None  [] : negative Slater reflex [FreeTextEntry9] : Cervical range of motion is restricted but he does have 30° bilateral cervical rotation and approximately 30° of flexion and extension.

## 2023-04-27 NOTE — HISTORY OF PRESENT ILLNESS
[de-identified] : 4/26/23: Patient returns to the office for follow up. He has no complaints with regard to his cervical spine or upper extremities at this point. He just wanted to check in and make sure everything was OK. He is retired from work since the last time we saw him. He ultimately underwent aortic valve replacement and is scheduled to undergo cardiac ablation in the near future. He also has a right finger trigger and follows with hand surgery for that. No complaints with cervical pain or cervical range of motion. 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

## 2023-04-27 NOTE — HISTORY OF PRESENT ILLNESS
[de-identified] : 4/26/23: Patient returns to the office for follow up. He has no complaints with regard to his cervical spine or upper extremities at this point. He just wanted to check in and make sure everything was OK. He is retired from work since the last time we saw him. He ultimately underwent aortic valve replacement and is scheduled to undergo cardiac ablation in the near future. He also has a right finger trigger and follows with hand surgery for that. No complaints with cervical pain or cervical range of motion. 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

## 2024-01-23 ENCOUNTER — APPOINTMENT (OUTPATIENT)
Dept: ORTHOPEDIC SURGERY | Facility: CLINIC | Age: 64
End: 2024-01-23
Payer: MEDICARE

## 2024-01-23 DIAGNOSIS — M65.30 TRIGGER FINGER, UNSPECIFIED FINGER: ICD-10-CM

## 2024-01-23 PROCEDURE — 99204 OFFICE O/P NEW MOD 45 MIN: CPT | Mod: 25

## 2024-01-23 PROCEDURE — 99214 OFFICE O/P EST MOD 30 MIN: CPT | Mod: 25

## 2024-01-23 PROCEDURE — 20550 NJX 1 TENDON SHEATH/LIGAMENT: CPT | Mod: RT

## 2024-01-23 NOTE — IMAGING
[de-identified] : Right hand with no swelling or erythema. Able to make fist, oppose thumb to small finger with good thenar bulk. No intrinsic atrophy. Tender along distal palmar crease, most notable at index finger. No overt locking with catching palpable. Sensation intact throughout with <2sec cap refill.

## 2024-01-23 NOTE — HISTORY OF PRESENT ILLNESS
[de-identified] : 62M, RHD, PMHX of Heart Disease, DM II presents with right hand index finger clicking, locking and sticking for approx 1 month. Reports was an annoyance at first, now starting to affect ADLs. Denies numbness/tingling, denies trauma/injury. Will have to manually extend finger once in a while.    1/23/24:  Right index finger f/u, reports CSI last visit helped significantly lasted up until the beginning of this month January 2024. Patient requesting another CSI today.

## 2024-01-27 PROBLEM — M65.30 TRIGGER FINGER: Status: ACTIVE | Noted: 2023-04-25

## 2024-02-07 NOTE — PATIENT PROFILE ADULT - FUNCTIONAL SCREEN CURRENT LEVEL: COMMUNICATION, MLM
Detail Level: Zone
Additional Notes: Patient consent was obtained to proceed with the visit and recommended plan of care after discussion of all risks and benefits, including the risks of COVID-19 exposure.\\n
Render Risk Assessment In Note?: no
0 = understands/communicates without difficulty

## 2024-02-19 NOTE — ASU PREOP CHECKLIST - TEMPERATURE IN FAHRENHEIT (DEGREES F)
The patient is in a chair.  He denies problems.  He reports he walked in the [place] with a cane in his left hand with the assistance of therapy but he got weak after awhile.    Temp:  [97.6 °F (36.4 °C)-98.5 °F (36.9 °C)] 98 °F (36.7 °C)  Pulse:  [80-98] 94  Resp:  [18] 18  SpO2:  [93 %-98 %] 96 %  BP: (108-152)/(63-82) 108/82    Physical examination:    Right arm dressing has mild drainage.  Alignment looks good clinically.  Radial nerve palsy persists as preoperative.      Recent Labs   Lab 02/19/24  0517   WBC 9.09   RBC 2.51*   HGB 8.6*   HCT 26.8*      *   MCH 34.3*   MCHC 32.1         Current Facility-Administered Medications:     amLODIPine tablet 10 mg, 10 mg, Oral, Daily, Yulissa Rich III, MD, 10 mg at 02/19/24 0943    artificial tears 0.5 % ophthalmic solution 1 drop, 1 drop, Both Eyes, PRN, Min Marinelli Jr., NP, 1 drop at 02/18/24 2343    chlordiazepoxide capsule 5 mg, 5 mg, Oral, BID, Odell Burgos MD    dextromethorphan-guaiFENesin  mg/5 ml liquid 10 mL, 10 mL, Oral, Q4H PRN, Min Marinelli Jr., NP, 10 mL at 02/18/24 2110    diazePAM tablet 10 mg, 10 mg, Oral, Q6H PRN, Yulissa Rich III, MD, 10 mg at 02/16/24 0905    enoxaparin injection 40 mg, 40 mg, Subcutaneous, Daily, Yulissa Rich III, MD, 40 mg at 02/18/24 1700    folic acid tablet 1 mg, 1 mg, Oral, Daily, Yulissa Rich III, MD, 1 mg at 02/19/24 0943    hydrALAZINE injection 10 mg, 10 mg, Intravenous, Q6H PRN, Yulissa Rich III, MD    HYDROcodone-acetaminophen  mg per tablet 1 tablet, 1 tablet, Oral, Q6H PRN, Yulissa Rich III, MD, 1 tablet at 02/18/24 1751    HYDROcodone-acetaminophen 5-325 mg per tablet 1 tablet, 1 tablet, Oral, Q4H PRN, Yulissa Rich III, MD, 1 tablet at 02/14/24 2014    LORazepam injection 1 mg, 1 mg, Intravenous, Q4H PRN, Yulissa Rich III, MD    melatonin tablet 6 mg, 6 mg, Oral, Nightly, Yulissa Rich III, MD, 6 mg at 02/18/24 2110    metoprolol tartrate  (LOPRESSOR) tablet 50 mg, 50 mg, Oral, Q6H, Yulissa Rich III, MD, 50 mg at 02/19/24 1213    morphine injection 4 mg, 4 mg, Intravenous, Q4H PRN, Yulissa Rich III, MD, 4 mg at 02/14/24 2337    multivitamin tablet, 1 tablet, Oral, Daily, Yulissa Rich III, MD, 1 tablet at 02/19/24 0943    nicotine 14 mg/24 hr 1 patch, 1 patch, Transdermal, Daily, Yulissa Rich III, MD, 1 patch at 02/19/24 0944    ondansetron injection 8 mg, 8 mg, Intravenous, Q8H PRN, Yulissa Rich III, MD    polyethylene glycol packet 17 g, 17 g, Oral, Daily PRN, Yulissa Rich III, MD    senna-docusate 8.6-50 mg per tablet 1 tablet, 1 tablet, Oral, BID, Yulissa Rich III, MD, 1 tablet at 02/19/24 0943    sodium chloride 0.9% flush 10 mL, 10 mL, Intravenous, PRN, Yulissa Rich III, MD    thiamine tablet 100 mg, 100 mg, Oral, Daily, Yulissa Rich III, MD, 100 mg at 02/19/24 0943    vitamin D 1000 units tablet 1,000 Units, 1,000 Units, Oral, Daily, Yulissa Rich III, MD, 1,000 Units at 02/19/24 0943    Facility-Administered Medications Ordered in Other Encounters:     mupirocin 2 % ointment, , Nasal, On Call Procedure, Colten Lujan MD, Given at 12/07/22 1241    Assessment and Plan:    65-year-old male presents following a fall with displaced right humeral shaft fracture.  He is noted to have radial nerve palsy prior to splinting.  Status post open reduction internal fixation of right humeral fracture with exploration of radial nerve found to be without laceration 2/16.     Continue with sling and wrist brace right wrist.      Recommend he have cessation of all nicotine products including patch, smoking, vaping, gum, etc. for fracture healing.    PT/OT -no weight-bearing right arm    Ready for discharge from orthopedic standpoint.    Plan follow up with Dr. Rich 2 weeks.    Yulissa Rich MD  [facility]  [phone]  This note has been transcribed with voice recognition software, but not  98 reviewed and may contain unrecognized errors.

## 2024-09-27 ENCOUNTER — APPOINTMENT (OUTPATIENT)
Dept: ORTHOPEDIC SURGERY | Facility: CLINIC | Age: 64
End: 2024-09-27
Payer: MEDICARE

## 2024-09-27 DIAGNOSIS — M65.30 TRIGGER FINGER, UNSPECIFIED FINGER: ICD-10-CM

## 2024-09-27 PROCEDURE — 99214 OFFICE O/P EST MOD 30 MIN: CPT | Mod: 57

## 2024-09-27 NOTE — ASSESSMENT
[FreeTextEntry1] : Right index finger trigger - Discussed with patient the pathoanatomy of trigger and options going forward. Patient has undergone one corticosteroid injections to A1 pulley with persistent symptoms. As this continues to interfere with ADLs, he is indicated for A1 pulley release. Risks, benefits and alternatives discussed with patient including infection, wound dehiscence, pain, stiffness, continued triggering, neurovascular injury specifically to the radial digital nerve, DVT and medical complications of anesthesia. Patient understood this conversation, questions were answered and patient elected to proceed. ***Plan for right index finger A1 pulley release under LOCAL ONLY at [facility] ***F/u 10 days following surgery

## 2024-09-27 NOTE — IMAGING
[de-identified] : Right hand with no swelling or erythema. Able to make fist, oppose thumb to small finger with good thenar bulk. No intrinsic atrophy. Tender along distal palmar crease, most notable at index finger. No overt locking with catching palpable. Sensation intact throughout with <2sec cap refill.

## 2024-09-27 NOTE — HISTORY OF PRESENT ILLNESS
[de-identified] : 62M, RHD, PMHX of Heart Disease, DM II presents with right hand index finger clicking, locking and sticking for approx 1 month. Reports was an annoyance at first, now starting to affect ADLs. Denies numbness/tingling, denies trauma/injury. Will have to manually extend finger once in a while.    1/23/24:  Right index finger f/u, reports CSI last visit helped significantly lasted up until the beginning of this month January 2024. Patient requesting another CSI today.   9/27/24: Right index finger f/u, patient had previous CSI 1/23/24. Patient repots symptoms started coming back in August 2024 with swelling, pain with bending.  Patient reports tingling and tightness that comes and goes all day long.

## 2024-11-20 ENCOUNTER — APPOINTMENT (OUTPATIENT)
Dept: ORTHOPEDIC SURGERY | Facility: AMBULATORY SURGERY CENTER | Age: 64
End: 2024-11-20

## 2024-11-20 ENCOUNTER — RESULT REVIEW (OUTPATIENT)
Age: 64
End: 2024-11-20

## 2024-11-20 PROCEDURE — 26145 TENDON EXCISION PALM/FINGER: CPT | Mod: F6

## 2024-12-02 ENCOUNTER — APPOINTMENT (OUTPATIENT)
Dept: ORTHOPEDIC SURGERY | Facility: CLINIC | Age: 64
End: 2024-12-02
Payer: MEDICARE

## 2024-12-02 DIAGNOSIS — M65.30 TRIGGER FINGER, UNSPECIFIED FINGER: ICD-10-CM

## 2024-12-02 PROCEDURE — 99024 POSTOP FOLLOW-UP VISIT: CPT

## 2024-12-03 ENCOUNTER — APPOINTMENT (OUTPATIENT)
Dept: ORTHOPEDIC SURGERY | Facility: CLINIC | Age: 64
End: 2024-12-03

## 2025-01-13 ENCOUNTER — APPOINTMENT (OUTPATIENT)
Dept: ORTHOPEDIC SURGERY | Facility: CLINIC | Age: 65
End: 2025-01-13